# Patient Record
Sex: FEMALE | Race: WHITE
[De-identification: names, ages, dates, MRNs, and addresses within clinical notes are randomized per-mention and may not be internally consistent; named-entity substitution may affect disease eponyms.]

---

## 2017-08-05 NOTE — REPUSA
CLINICAL HISTORY: Trauma.

TECHNIQUE: Multiple axial CT images were obtained through chest with IV contrast material. MPR corona
l and sagittal sequences were obtained.

COMMENTS:

There is no evidence of pleural or parenchymal mass. There are no pleural effusions. There is no evid
ence of hilar or mediastinal lymphadenopathy. The heart and great vessels are within normal limits.

The bony structures are free of lytic or blastic lesions. Multilevel degenerative changes are seen in
volving the thoracic spine. No fracture is noted.

No evidence for abnormal enhancement.

IMPRESSION:

No evidence of acute thoracic pathology. 

Thank you for your kind referral of this patient.

 

     Electronically signed by VLADISLAV BARON MD on 08/05/2017 11:45:48 PM ET

## 2017-08-05 NOTE — REPUSA
CLINICAL HISTORY: Neck pain. Trauma.

TECHNIQUE: Multiple axial images were obtained through the cervical spine. Images were also reconstru
cted in coronal and sagittal planes. The study was performed without IV contrast.

COMMENTS:

There is no fracture or spondylolisthesis visualized. The paraspinal soft tissues are unremarkable. T
here are no lytic or blastic lesions.

Straightening of cervical lordosis is seen, suggesting muscular spasm. There is evidence of multileve
l disk disease, demonstrated by osteophytosis and endplate sclerosis.

IMPRESSION:

1. No fracture or spondylolisthesis.

2. Straightening of cervical lordosis is seen, suggesting muscular spasm.

3. Multilevel spondylosis. 

Thank you for your kind referral of this patient.

     Electronically signed by VLADISLAV BARON MD on 08/05/2017 11:42:10 PM ET

## 2017-08-05 NOTE — REPUSA
CLINICAL HISTORY: Head trauma.

TECHNIQUE: Multiple axial brain CT scan sections were obtained from base to vertex without contrast a
dministration.

COMMENTS: 

There is no evidence of skull fracture.

The study shows normal configuration of sella turcica. There are no intra or extra-axial collections.
 There is no mass effect or midline shift. There is no evidence of hematoma formation. No hydrocephal
us is present. No abnormal calcifications are noted.

No significant abnormalities are seen either in the posterior fossa or supratentorial compartment.

The mastoid air cells are patent.

IMPRESSION:

No evidence of acute intracranial pathology. No intracranial hemorrhage or skull fracture.

Thank you for your kind referral of this patient.

     Electronically signed by VLADISLAV BARON MD on 08/05/2017 11:49:22 PM ET

## 2017-08-06 NOTE — REP
AP SUPINE PORTABLE CHEST: 08/05/2017.

 

Comparison: CT chest earlier this date and chest x-ray 07/10/2014.

 

Clinical history:  Trauma.

 

Findings:  Metallic artifact from spine board and strap overlying the chest.

There is elevated right diaphragm as on CT and  film.   The lung fields are

overpenetrated.  The cardiomediastinal silhouette is not abnormally enlarged nor

is there widening of the mediastinum.  There is a fracture of the proximal

humerus seen in part and the left humerus is obscured by marker, spine board

metal and edge of field of view.  On the  for the CT scan earlier this

evening the upper right humeral shaft fracture is seen and I am suspicious for a

possible fracture on the left.

 

Impression:

 

1.  Very limited supine portable chest with lung fields overpenetrated.  No

midline shift.  Cardial mediastinal silhouette not enlarged.  Elevated

diaphragm.

 

2.  There is a definite fracture of the proximal right humeral shaft near the

surgical neck.  Left proximal humerus not well seen on this exam and a fracture

is not excluded there.  Dedicated bilateral shoulder and humerus views are

needed.

 

 

Signed by

Marcello Villa MD 08/06/2017 05:59 P

## 2017-08-06 NOTE — ECGEPIP
Stationary ECG Study

                           [facility] - ED

                                       

                                       Test Date:    2017

Pat Name:     HERSON CHANCE             Department:   

Patient ID:   [id number]                 Room:         -

Gender:       F                        Technician:   aliya VOSSB:          1965               Requested By: MICHAEL BANDA

Order Number: [id number]     Reading MD:   Kevin Cueto

                                 Measurements

Intervals                              Axis          

Rate:         91                       P:            39

KY:           134                      QRS:          39

QRSD:         88                       T:            45

QT:           364                                    

QTc:          448                                    

                           Interpretive Statements

SINUS RHYTHM

 

Electronically Signed On 2017 8:11:05 EDT by Kevin Cueto

## 2017-08-06 NOTE — REP
LEFT FEMUR SERIES:  08/05/2017.

 

Clinical history:  Trauma.  Evaluate for fracture.

 

Findings:

 

Seven views were provided of the femur.  A spine board yahaira was present overlying

much of the bone.  The hip, acetabulum, pubic rami and symphysis pubis intact.

The femoral neck, trochanters and shaft of the femur intact.  The distal femoral

metaphysis and condyles were unremarkable.  There is no suprapatellar effusion on

the cross-table lateral view.  The view over the distal femur and knee there is a

displaced fracture of the proximal tibial shaft.  Please see the tibia-fibula

series for full detail.

 

Impression:

 

1.  No visible femoral fracture.  A displaced proximal tibial fracture noted.

 

 

Signed by

Marcello Villa MD 08/06/2017 06:02 P

## 2017-08-06 NOTE — REPUSA
HISTORY: Trauma.

TECHNIQUE: Multiple thin section helically-acquired axially-displayed and helically acquired coronall
y displayed computed tomographic images of the face are obtained from the mandible through the fronta
l sinuses, with images obtained at soft tissue and bone window. 2D reformatted images were performed.


FINDINGS:

There are commuted fractures of anterior and medial walls of left maxillary sinus.

There is a minimal fracture involving left orbital floor without herniation of orbital contents or en
trapment.  Extensive left periorbital swelling is seen..

Bilateral ethmoid, left maxillary and left sphenoid sinusitis is seen.

Normal bony mineralization. No additional fractures.

Normal oral and nasal cavities.

Normal infratemporal fossa and deep parapharyngeal spaces with normal muscles of mastication. Normal 
parotid and submandibular glands.

IMPRESSION:

Comminuted fractures of anterior and medial walls of left maxillary sinus.

Minimal fracture involving left orbital floor without herniation of orbital contents. 

Extensive left periorbital swelling is seen..

 

     Electronically signed by VLADISLAV BARON MD on 08/06/2017 12:00:28 AM ET

## 2017-08-06 NOTE — REPUSA
CLINICAL HISTORY: Trauma.

 

TECHNIQUE: CT chest, abdomen and pelvis with contrast total DLP 2116.4 mGy*cm

 

COMPARISON: No study for comparison is available at the time of interpretation.

CT CHEST WITH CONTRAST

Great vessels: The aorta is normal caliber. The pulmonary arteries are patent to the extent visualize
d.

Heart: Normal size, no effusion.

Mediastinum: Nontraumatic.

Lungs: Patchy regions of atelectasis, without contusion, pneumothorax or effusion.

Skeletal thorax: There is a nondisplaced fracture of the left posterior 10th rib (axial image 76-78).
 There is a nondisplaced fracture of the right T12 transverse process. There is a minimally displaced
 fracture of the right L1 transverse process. There are nondisplaced fractures of the right 7th and 8
th ribs anteriorly.

Stomach: Small hiatal hernia noted.

CT ABDOMEN WITH CONTRAST

Liver: Nontraumatic. No ductal dilation.

Gallbladder: Nondistended.

Pancreas: Nontraumatic. No ductal dilation.

Spleen: Normal.

Adrenals: Normal.

Kidneys: Nontraumatic. No hydronephrosis. Small right renal cyst.

Aorta: Nontraumatic. Normal caliber.

Bowel loops: Nondistended.

Peritoneum: No free air. No ascites.

Lumbar spine: Fractures of the transverse processes of right L1, right L2, bilateral L3, bilateral L4
, bilateral L5, and right S1 levels. No lumbar compression fractures.

CT PELVIS WITH CONTRAST

Pelvis: Fractures of the right iliac wing extending into the posterior aspect of the right acetabulum
. Fractures of the right superior and inferior pubic rami, extending into the anterior aspect of the 
right acetabulum. There is edema and fat stranding of the right pelvic wall, extending along the righ
t external iliac vessels, such that a minor injury of the external iliac vein is not excluded.

Bladder: Nontraumatic.

Colon: Scattered diverticula without evidence of diverticulitis.

IMPRESSION:

1. Major fracture of the right pelvis, involving the right iliac wing, anterior and posterior columns
 of the right acetabulum, and superior and inferior right pubic rami. Fat stranding of the right pelv
ic wall, extending along the right external iliac vessels, such that a minor injury of the external i
liac vein is not excluded.

 

2. Small fractures of the right 7th and 8th ribs, left 10th rib, and right T12 transverse process.

3. Fractures of the transverse processes of right L1, right L2, bilateral L3, bilateral L4, bilateral
 L5, and right S1 levels.

     Electronically signed by ANDREA HEADLEY MD on 08/05/2017 11:52:35 PM ET

## 2017-08-06 NOTE — REP
LEFT TIBIA-FIBULA SERIES:  08/05/2017.

 

Clinical history:  Trauma.

 

Findings:  Four views are provided.  There are displaced fractures of the

proximal shaft in the junction of the middle and distal one-third shaft of the

tibia.  The middle fragment is displaced medially and posteriorly by up to

one-half shaft width.  The distal fragment is displaced from that middle fragment

by a similar half shaft width.  There is also a displaced fracture of the distal

one-third shaft of the fibula adjacent to the more inferior tibial fracture.  No

other fractures.

 

 

Signed by

Marcello Villa MD 08/06/2017 06:02 P

## 2017-09-19 NOTE — REPUSA
Clinical history: Right upper quadrant pain.

Findings: The pancreas is limited in visualization secondary to overlying bowel gas, but appears dalila
sly unremarkable. The liver demonstrates increased echotexture and echogenicity, with no mass lesions
. The gallbladder contains echogenic, non-shadowing debris. The gallbladder wall is slightly thickene
d measuring 3.2 mm and diameter. A positive sonographic Hayden's sign was elicited. Suspected tiny ga
llstones are also noted. The common bile duct measures 6 mm and is within normal limits. The right ki
dney measures 12.4 x 5.8 x 4.2 cm. A simple right renal cyst measures 1.7 x 1.4 x 2.1 cm. There is no
 ascites.

Impression:

1. Cholelithiasis, gallbladder sludge, with minimal gallbladder wall thickening. No biliary ductal di
latation or pericholecystic free fluid. However, with the positive sonographic Hayden's sign, early a
cute cholecystitis must be considered.

2. Fatty infiltration of the liver.

3. Simple right renal cyst.

     Electronically signed by ANGEL LEUNG MD on 09/19/2017 06:16:59 PM ET

## 2017-09-19 NOTE — ECGEPIP
Stationary ECG Study

                           [facility] - ED

                                       

                                       Test Date:    2017

Pat Name:     HERSON CHANCE             Department:   

Patient ID:   [id number]                 Room:         -

Gender:       F                        Technician:   thomas

:          1965               Requested By: Kevin FALK

Order Number: [id number]     Reading MD:   Kevin Cueto

                                 Measurements

Intervals                              Axis          

Rate:         104                      P:            22

AR:           153                      QRS:          14

QRSD:         107                      T:            39

QT:           350                                    

QTc:          462                                    

                           Interpretive Statements

SINUS TACHYCARDIA

 

 

Electronically Signed On 2017 20:11:22 EDT by Kevin Cueto

## 2017-09-19 NOTE — REPUSA
CT of the chest

Clinical statement: fever.

Technique: Multiple axial CT images were obtained from the thoracic inlet through the upper abdomen a
fter a bolus administration of nonionic intravenous contrast. Coronal and sagittal reconstructions we
re also obtained.

Comparison: 8/5/2017.

Findings: The pulmonary arteries are well-opacified with contrast, with no intraluminal filling defec
ts to suggest embolism. The thoracic aorta is unremarkable. Thyroid gland is within normal limits. Th
ere is no thoracic lymphadenopathy. There are no pericardial or pleural effusions. There is minimal a
telectasis in the lung bases bilaterally. Limited imaging of the upper abdomen is unremarkable. There
 are no acute osseous abnormalities. The previous fractures are stable and healing.

Impression: Minimal atelectasis/infiltrate in the lung bases bilaterally. Otherwise stable examinatio
n.

     Electronically signed by ANGEL LEUNG MD on 09/19/2017 09:42:12 PM ET

## 2017-09-19 NOTE — REPUSA
CT of the abdomen and pelvis with contrast

Clinical statement: Pain. Fever.

Technique: Multiple axial CT images were obtained from the base of the lungs through the floor of the
 pelvis utilizing 5 mm axial slices after administration of oral and nonionic intravenous contrast. C
oronal and sagittal reconstructions were also obtained.

Comparison: 8/5/2017.

Findings:

Abdomen: The liver, spleen, pancreas, kidneys, gallbladder, and adrenal glands are unremarkable. The 
aorta is within normal limits. There is no evidence of abdominal lymphadenopathy or ascites. There is
 a small umbilical hernia containing only omental fat.

Pelvis: The bowel is unremarkable, with no obstructive or inflammatory changes. The urinary bladder i
s contracted and catheterized. The other pelvic structures appear grossly intact. There is no evidenc
e of pelvic lymphadenopathy or ascites.

Bones: There are surgical screws fixating the right pelvic fracture. The fracture extends into the ri
ght acetabulum. There is a nondisplaced left transverse process fractures of L3 and L4. There is a he
aling fracture the right inferior pubic ramus. No acute fractures identified.

Impression:

1. No focal acute abnormality to explain the patient's pain.

2. No obstructive or inflammatory bowel changes.

3. Small umbilical hernia without bowel involvement.

4. Catheterized, khoi urinary bladder.

5. Subacute/chronic fractures as described. No acute fractures are identified.

     Electronically signed by ANGEL LEUNG MD on 09/19/2017 09:50:26 PM ET

## 2017-09-20 NOTE — REP
PORTABLE CHEST:

 

AP portable view of the chest is performed.  Comparison 07/10/2014 and

08/05/2017.  There is elevation of the right hemidiaphragm again seen.  There is

mild right base fibroatelectatic change which is stable.  The mediastinal

silhouette is unchanged.  There is no new infiltrate.

 

IMPRESSION:

 

No acute infiltrate.

 

 

Signed by

Sincere Gutierrez MD 09/20/2017 09:41 A

## 2017-09-22 NOTE — ED PDOC
Post-Departure Follow-Up


radiology report faxed to [facility].











Madai Diop MD Sep 22, 2017 08:06

## 2018-03-02 ENCOUNTER — HOSPITAL ENCOUNTER (EMERGENCY)
Dept: HOSPITAL 53 - M ED | Age: 53
Discharge: HOME | End: 2018-03-02
Payer: COMMERCIAL

## 2018-03-02 DIAGNOSIS — Z88.8: ICD-10-CM

## 2018-03-02 DIAGNOSIS — R19.7: ICD-10-CM

## 2018-03-02 DIAGNOSIS — Z98.890: ICD-10-CM

## 2018-03-02 DIAGNOSIS — Z87.891: ICD-10-CM

## 2018-03-02 DIAGNOSIS — R11.2: Primary | ICD-10-CM

## 2018-03-02 DIAGNOSIS — R93.5: ICD-10-CM

## 2018-03-02 DIAGNOSIS — Z79.899: ICD-10-CM

## 2018-03-02 LAB
ALBUMIN/GLOBULIN RATIO: 0.93 (ref 1–1.93)
ALBUMIN: 4 GM/DL (ref 3.2–5.2)
ALKALINE PHOSPHATASE: 126 U/L (ref 45–117)
ALT SERPL W P-5'-P-CCNC: 21 U/L (ref 12–78)
AMYLASE SERPL-CCNC: 43 U/L (ref 25–115)
ANION GAP: 9 MEQ/L (ref 8–16)
AST SERPL-CCNC: 8 U/L (ref 7–37)
BASO #: 0.1 10^3/UL (ref 0–0.2)
BASO %: 0.5 % (ref 0–1)
BILIRUB CONJ SERPL-MCNC: 0.1 MG/DL (ref 0–0.2)
BILIRUBIN,TOTAL: 0.3 MG/DL (ref 0.2–1)
BLOOD UREA NITROGEN: 16 MG/DL (ref 7–18)
CALCIUM LEVEL: 9.8 MG/DL (ref 8.5–10.1)
CARBON DIOXIDE LEVEL: 24 MEQ/L (ref 21–32)
CHLORIDE LEVEL: 108 MEQ/L (ref 98–107)
CREATININE FOR GFR: 0.8 MG/DL (ref 0.55–1.3)
D-DIMER QUANT: 1755.2 NG/ML (ref ?–500)
EOS #: 0.1 10^3/UL (ref 0–0.5)
EOSINOPHIL NFR BLD AUTO: 1.4 % (ref 0–3)
GFR SERPL CREATININE-BSD FRML MDRD: > 60 ML/MIN/{1.73_M2} (ref 51–?)
GLUCOSE, FASTING: 104 MG/DL (ref 70–100)
HEMATOCRIT: 40.5 % (ref 36–47)
HEMOGLOBIN: 13.9 G/DL (ref 12–16)
IMMATURE GRANULOCYTE %: 0.5 % (ref 0–3)
LEUKOCYTE ESTERASE UR AUTO RFX: NEGATIVE
LIPASE: 127 U/L (ref 73–393)
LYMPH #: 1.4 10^3/UL (ref 1.5–4.5)
LYMPH %: 14.3 % (ref 24–44)
MEAN CORPUSCULAR HEMOGLOBIN: 29.7 PG (ref 27–33)
MEAN CORPUSCULAR HGB CONC: 34.3 G/DL (ref 32–36.5)
MEAN CORPUSCULAR VOLUME: 86.5 FL (ref 80–96)
MONO #: 0.6 10^3/UL (ref 0–0.8)
MONO %: 5.8 % (ref 0–5)
NEUTROPHILS #: 7.3 10^3/UL (ref 1.8–7.7)
NEUTROPHILS %: 77.5 % (ref 36–66)
NRBC BLD AUTO-RTO: 0 % (ref 0–0)
PLATELET COUNT, AUTOMATED: 406 10^3/UL (ref 150–450)
POTASSIUM SERUM: 4.4 MEQ/L (ref 3.5–5.1)
RED BLOOD COUNT: 4.68 10^6/UL (ref 4–5.4)
RED CELL DISTRIBUTION WIDTH: 12.7 % (ref 11.5–14.5)
SODIUM LEVEL: 141 MEQ/L (ref 136–145)
SPECIFIC GRAVITY UR AUTO RFX: 1.01 (ref 1–1.03)
SQUAM EPITHELIAL CELL UR AURFX: 0 /HPF (ref 0–6)
TOTAL PROTEIN: 8.3 GM/DL (ref 6.4–8.2)
WHITE BLOOD COUNT: 9.4 10^3/UL (ref 4–10)

## 2018-03-02 RX ADMIN — ONDANSETRON 1 MG: 2 INJECTION INTRAMUSCULAR; INTRAVENOUS at 08:15

## 2018-03-02 RX ADMIN — SODIUM CHLORIDE 1 MLS/HR: 9 INJECTION, SOLUTION INTRAVENOUS at 08:54

## 2018-04-16 ENCOUNTER — HOSPITAL ENCOUNTER (OUTPATIENT)
Dept: HOSPITAL 53 - M LAB REF | Age: 53
End: 2018-04-16
Attending: INTERNAL MEDICINE
Payer: COMMERCIAL

## 2018-04-16 DIAGNOSIS — Z51.81: Primary | ICD-10-CM

## 2018-04-16 DIAGNOSIS — Z79.2: ICD-10-CM

## 2018-04-16 LAB
ALBUMIN/GLOBULIN RATIO: 0.95 (ref 1–1.93)
ALBUMIN: 3.7 GM/DL (ref 3.2–5.2)
ALKALINE PHOSPHATASE: 142 U/L (ref 45–117)
ALT SERPL W P-5'-P-CCNC: 18 U/L (ref 12–78)
ANION GAP: 5 MEQ/L (ref 8–16)
AST SERPL-CCNC: 10 U/L (ref 7–37)
BASO #: 0.1 10^3/UL (ref 0–0.2)
BASO %: 0.9 % (ref 0–1)
BILIRUBIN,TOTAL: 0.3 MG/DL (ref 0.2–1)
BLOOD UREA NITROGEN: 16 MG/DL (ref 7–18)
CALCIUM LEVEL: 9 MG/DL (ref 8.5–10.1)
CARBON DIOXIDE LEVEL: 26 MEQ/L (ref 21–32)
CHLORIDE LEVEL: 111 MEQ/L (ref 98–107)
CREATININE FOR GFR: 0.66 MG/DL (ref 0.55–1.3)
CRP SERPL-MCNC: 0.98 MG/DL (ref 0–0.3)
EOS #: 0.4 10^3/UL (ref 0–0.5)
EOSINOPHIL NFR BLD AUTO: 7.7 % (ref 0–3)
ERYTHROCYTE SEDIMENTATION RATE: 47 MM/HR (ref 0–30)
GFR SERPL CREATININE-BSD FRML MDRD: > 60 ML/MIN/{1.73_M2} (ref 51–?)
GLUCOSE, FASTING: 91 MG/DL (ref 70–100)
HEMATOCRIT: 35 % (ref 36–47)
HEMOGLOBIN: 11.5 G/DL (ref 12–15.5)
IMMATURE GRANULOCYTE %: 0.7 % (ref 0–3)
LYMPH #: 1.1 10^3/UL (ref 1.5–4.5)
LYMPH %: 20.4 % (ref 24–44)
MEAN CORPUSCULAR HEMOGLOBIN: 29.7 PG (ref 27–33)
MEAN CORPUSCULAR HGB CONC: 32.9 G/DL (ref 32–36.5)
MEAN CORPUSCULAR VOLUME: 90.4 FL (ref 80–96)
MONO #: 0.7 10^3/UL (ref 0–0.8)
MONO %: 12.1 % (ref 0–5)
NEUTROPHILS #: 3.3 10^3/UL (ref 1.8–7.7)
NEUTROPHILS %: 58.2 % (ref 36–66)
NRBC BLD AUTO-RTO: 0 % (ref 0–0)
PLATELET COUNT, AUTOMATED: 282 10^3/UL (ref 150–450)
POTASSIUM SERUM: 4.5 MEQ/L (ref 3.5–5.1)
RED BLOOD COUNT: 3.87 10^6/UL (ref 4–5.4)
RED CELL DISTRIBUTION WIDTH: 13.9 % (ref 11.5–14.5)
SODIUM LEVEL: 142 MEQ/L (ref 136–145)
TOTAL PROTEIN: 7.6 GM/DL (ref 6.4–8.2)
VANCOMYCIN LEVEL TROUGH: 11.5 UG/ML (ref 10–20)
WHITE BLOOD COUNT: 5.6 10^3/UL (ref 4–10)

## 2018-04-20 ENCOUNTER — HOSPITAL ENCOUNTER (OUTPATIENT)
Dept: HOSPITAL 53 - M LAB REF | Age: 53
End: 2018-04-20
Attending: INTERNAL MEDICINE
Payer: COMMERCIAL

## 2018-04-20 DIAGNOSIS — B95.7: ICD-10-CM
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
## 2018-04-20 DIAGNOSIS — Z79.2: ICD-10-CM

## 2018-04-20 DIAGNOSIS — T84.7XXD: Primary | ICD-10-CM

## 2018-04-20 LAB — VANCOMYCIN LEVEL TROUGH: 11.8 UG/ML (ref 10–20)

## 2018-04-20 PROCEDURE — 80202 ASSAY OF VANCOMYCIN: CPT

## 2018-04-23 ENCOUNTER — HOSPITAL ENCOUNTER (OUTPATIENT)
Dept: HOSPITAL 53 - M LAB REF | Age: 53
End: 2018-04-23
Attending: INTERNAL MEDICINE
Payer: COMMERCIAL

## 2018-04-23 DIAGNOSIS — T84.7XXD: Primary | ICD-10-CM

## 2018-04-23 DIAGNOSIS — Z79.2: ICD-10-CM

## 2018-04-23 DIAGNOSIS — B95.7: ICD-10-CM

## 2018-04-23 LAB
ALBUMIN/GLOBULIN RATIO: 1 (ref 1–1.93)
ALBUMIN: 4.1 GM/DL (ref 3.2–5.2)
ALKALINE PHOSPHATASE: 144 U/L (ref 45–117)
ALT SERPL W P-5'-P-CCNC: 30 U/L (ref 12–78)
ANION GAP: 8 MEQ/L (ref 8–16)
AST SERPL-CCNC: 11 U/L (ref 7–37)
BASO #: 0 10^3/UL (ref 0–0.2)
BASO %: 1.1 % (ref 0–1)
BILIRUBIN,TOTAL: 0.3 MG/DL (ref 0.2–1)
BLOOD UREA NITROGEN: 21 MG/DL (ref 7–18)
CALCIUM LEVEL: 9.1 MG/DL (ref 8.5–10.1)
CARBON DIOXIDE LEVEL: 22 MEQ/L (ref 21–32)
CHLORIDE LEVEL: 108 MEQ/L (ref 98–107)
CREATININE FOR GFR: 0.66 MG/DL (ref 0.55–1.3)
CRP SERPL-MCNC: 1.06 MG/DL (ref 0–0.3)
EOS #: 0.4 10^3/UL (ref 0–0.5)
EOSINOPHIL NFR BLD AUTO: 11.7 % (ref 0–3)
ERYTHROCYTE SEDIMENTATION RATE: 59 MM/HR (ref 0–30)
GFR SERPL CREATININE-BSD FRML MDRD: > 60 ML/MIN/{1.73_M2} (ref 51–?)
GLUCOSE, FASTING: 88 MG/DL (ref 70–100)
HEMATOCRIT: 37 % (ref 36–47)
HEMOGLOBIN: 12.6 G/DL (ref 12–15.5)
IMMATURE GRANULOCYTE %: 1.1 % (ref 0–3)
LYMPH #: 1 10^3/UL (ref 1.5–4.5)
LYMPH %: 27.7 % (ref 24–44)
MEAN CORPUSCULAR HEMOGLOBIN: 29.7 PG (ref 27–33)
MEAN CORPUSCULAR HGB CONC: 34.1 G/DL (ref 32–36.5)
MEAN CORPUSCULAR VOLUME: 87.3 FL (ref 80–96)
MONO #: 0.6 10^3/UL (ref 0–0.8)
MONO %: 16.3 % (ref 0–5)
NEUTROPHILS #: 1.6 10^3/UL (ref 1.8–7.7)
NEUTROPHILS %: 42.1 % (ref 36–66)
NRBC BLD AUTO-RTO: 0 % (ref 0–0)
PLATELET COUNT, AUTOMATED: 254 10^3/UL (ref 150–450)
POTASSIUM SERUM: 4.5 MEQ/L (ref 3.5–5.1)
RED BLOOD COUNT: 4.24 10^6/UL (ref 4–5.4)
RED CELL DISTRIBUTION WIDTH: 13.4 % (ref 11.5–14.5)
SODIUM LEVEL: 138 MEQ/L (ref 136–145)
TOTAL PROTEIN: 8.2 GM/DL (ref 6.4–8.2)
VANCOMYCIN LEVEL TROUGH: 11.6 UG/ML (ref 10–20)
WHITE BLOOD COUNT: 3.7 10^3/UL (ref 4–10)

## 2018-04-23 PROCEDURE — 80053 COMPREHEN METABOLIC PANEL: CPT

## 2018-04-27 ENCOUNTER — HOSPITAL ENCOUNTER (OUTPATIENT)
Dept: HOSPITAL 53 - M LAB REF | Age: 53
End: 2018-04-27
Attending: INTERNAL MEDICINE
Payer: COMMERCIAL

## 2018-04-27 DIAGNOSIS — B95.7: ICD-10-CM

## 2018-04-27 DIAGNOSIS — T84.7XXD: Primary | ICD-10-CM

## 2018-04-27 DIAGNOSIS — Z79.2: ICD-10-CM
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
## 2018-04-27 LAB — VANCOMYCIN LEVEL TROUGH: 15.1 UG/ML (ref 10–20)

## 2018-04-30 ENCOUNTER — HOSPITAL ENCOUNTER (OUTPATIENT)
Dept: HOSPITAL 53 - M LAB REF | Age: 53
End: 2018-04-30
Attending: INTERNAL MEDICINE
Payer: COMMERCIAL

## 2018-04-30 DIAGNOSIS — Z79.2: Primary | ICD-10-CM

## 2018-04-30 LAB
ALBUMIN/GLOBULIN RATIO: 0.97 (ref 1–1.93)
ALBUMIN: 3.8 GM/DL (ref 3.2–5.2)
ALKALINE PHOSPHATASE: 132 U/L (ref 45–117)
ALT SERPL W P-5'-P-CCNC: 40 U/L (ref 12–78)
ANION GAP: 8 MEQ/L (ref 8–16)
AST SERPL-CCNC: 10 U/L (ref 7–37)
BASO #: 0.1 10^3/UL (ref 0–0.2)
BASO %: 1.1 % (ref 0–1)
BILIRUBIN,TOTAL: 0.2 MG/DL (ref 0.2–1)
BLOOD UREA NITROGEN: 16 MG/DL (ref 7–18)
CALCIUM LEVEL: 8.9 MG/DL (ref 8.5–10.1)
CARBON DIOXIDE LEVEL: 24 MEQ/L (ref 21–32)
CHLORIDE LEVEL: 110 MEQ/L (ref 98–107)
CREATININE FOR GFR: 0.64 MG/DL (ref 0.55–1.3)
CRP SERPL-MCNC: 1.32 MG/DL (ref 0–0.3)
EOS #: 0.5 10^3/UL (ref 0–0.5)
EOSINOPHIL NFR BLD AUTO: 10.5 % (ref 0–3)
ERYTHROCYTE SEDIMENTATION RATE: 50 MM/HR (ref 0–30)
GFR SERPL CREATININE-BSD FRML MDRD: > 60 ML/MIN/{1.73_M2} (ref 51–?)
GLUCOSE, FASTING: 115 MG/DL (ref 70–100)
HEMATOCRIT: 35.6 % (ref 36–47)
HEMOGLOBIN: 11.7 G/DL (ref 12–15.5)
IMMATURE GRANULOCYTE %: 2 % (ref 0–3)
LYMPH #: 1.1 10^3/UL (ref 1.5–4.5)
LYMPH %: 23.5 % (ref 24–44)
MEAN CORPUSCULAR HEMOGLOBIN: 29.6 PG (ref 27–33)
MEAN CORPUSCULAR HGB CONC: 32.9 G/DL (ref 32–36.5)
MEAN CORPUSCULAR VOLUME: 90.1 FL (ref 80–96)
MONO #: 0.5 10^3/UL (ref 0–0.8)
MONO %: 11.2 % (ref 0–5)
NEUTROPHILS #: 2.4 10^3/UL (ref 1.8–7.7)
NEUTROPHILS %: 51.7 % (ref 36–66)
NRBC BLD AUTO-RTO: 0 % (ref 0–0)
PLATELET COUNT, AUTOMATED: 261 10^3/UL (ref 150–450)
POTASSIUM SERUM: 4 MEQ/L (ref 3.5–5.1)
RED BLOOD COUNT: 3.95 10^6/UL (ref 4–5.4)
RED CELL DISTRIBUTION WIDTH: 13.3 % (ref 11.5–14.5)
SODIUM LEVEL: 142 MEQ/L (ref 136–145)
TOTAL PROTEIN: 7.7 GM/DL (ref 6.4–8.2)
VANCOMYCIN LEVEL TROUGH: 14.4 UG/ML (ref 10–20)
WHITE BLOOD COUNT: 4.6 10^3/UL (ref 4–10)

## 2018-07-26 ENCOUNTER — HOSPITAL ENCOUNTER (EMERGENCY)
Dept: HOSPITAL 53 - M ED | Age: 53
Discharge: HOME | End: 2018-07-26
Payer: COMMERCIAL

## 2018-07-26 DIAGNOSIS — Z79.899: ICD-10-CM
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
## 2018-07-26 DIAGNOSIS — I10: ICD-10-CM
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
## 2018-07-26 DIAGNOSIS — J30.2: ICD-10-CM

## 2018-07-26 DIAGNOSIS — Z88.8: ICD-10-CM
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
## 2018-07-26 DIAGNOSIS — S20.212A: Primary | ICD-10-CM

## 2018-07-26 DIAGNOSIS — K21.9: ICD-10-CM

## 2018-07-26 RX ADMIN — PROMETHAZINE HYDROCHLORIDE 1 MG: 25 INJECTION INTRAMUSCULAR; INTRAVENOUS at 12:50

## 2018-07-26 RX ADMIN — MORPHINE SULFATE 1 MG: 10 INJECTION INTRAVENOUS at 12:50

## 2019-03-04 ENCOUNTER — HOSPITAL ENCOUNTER (EMERGENCY)
Dept: HOSPITAL 53 - M ED | Age: 54
Discharge: HOME | End: 2019-03-04
Payer: COMMERCIAL

## 2019-03-04 VITALS — BODY MASS INDEX: 48.82 KG/M2 | WEIGHT: 293 LBS | HEIGHT: 65 IN

## 2019-03-04 VITALS — SYSTOLIC BLOOD PRESSURE: 143 MMHG | DIASTOLIC BLOOD PRESSURE: 76 MMHG

## 2019-03-04 DIAGNOSIS — B37.2: ICD-10-CM

## 2019-03-04 DIAGNOSIS — K21.9: ICD-10-CM

## 2019-03-04 DIAGNOSIS — K52.9: Primary | ICD-10-CM

## 2019-03-04 DIAGNOSIS — M19.90: ICD-10-CM

## 2019-03-04 DIAGNOSIS — Z79.82: ICD-10-CM

## 2019-03-04 DIAGNOSIS — I10: ICD-10-CM

## 2019-03-04 DIAGNOSIS — F32.9: ICD-10-CM

## 2019-03-04 DIAGNOSIS — Z88.1: ICD-10-CM

## 2019-03-04 DIAGNOSIS — Z79.899: ICD-10-CM

## 2019-03-04 LAB
ALBUMIN SERPL BCG-MCNC: 4 GM/DL (ref 3.2–5.2)
ALT SERPL W P-5'-P-CCNC: 34 U/L (ref 12–78)
B-HCG SERPL QL: NEGATIVE
BASOPHILS # BLD AUTO: 0.1 10^3/UL (ref 0–0.2)
BASOPHILS NFR BLD AUTO: 0.6 % (ref 0–1)
BILIRUB CONJ SERPL-MCNC: 0.1 MG/DL (ref 0–0.2)
BILIRUB SERPL-MCNC: 0.4 MG/DL (ref 0.2–1)
BUN SERPL-MCNC: 16 MG/DL (ref 7–18)
CALCIUM SERPL-MCNC: 8.9 MG/DL (ref 8.5–10.1)
CHLORIDE SERPL-SCNC: 107 MEQ/L (ref 98–107)
CO2 SERPL-SCNC: 20 MEQ/L (ref 21–32)
CREAT SERPL-MCNC: 0.99 MG/DL (ref 0.55–1.3)
EOSINOPHIL # BLD AUTO: 0.4 10^3/UL (ref 0–0.5)
EOSINOPHIL NFR BLD AUTO: 2.9 % (ref 0–3)
GFR SERPL CREATININE-BSD FRML MDRD: > 60 ML/MIN/{1.73_M2} (ref 51–?)
GLUCOSE SERPL-MCNC: 148 MG/DL (ref 70–100)
HCT VFR BLD AUTO: 45.5 % (ref 36–47)
HGB BLD-MCNC: 15.3 G/DL (ref 12–15.5)
LIPASE SERPL-CCNC: 136 U/L (ref 73–393)
LYMPHOCYTES # BLD AUTO: 1.4 10^3/UL (ref 1.5–4.5)
LYMPHOCYTES NFR BLD AUTO: 9.8 % (ref 24–44)
MCH RBC QN AUTO: 29.5 PG (ref 27–33)
MCHC RBC AUTO-ENTMCNC: 33.6 G/DL (ref 32–36.5)
MCV RBC AUTO: 87.8 FL (ref 80–96)
MONOCYTES # BLD AUTO: 1.1 10^3/UL (ref 0–0.8)
MONOCYTES NFR BLD AUTO: 7.7 % (ref 0–5)
NEUTROPHILS # BLD AUTO: 11 10^3/UL (ref 1.8–7.7)
NEUTROPHILS NFR BLD AUTO: 78.1 % (ref 36–66)
PLATELET # BLD AUTO: 381 10^3/UL (ref 150–450)
POTASSIUM SERPL-SCNC: 3.8 MEQ/L (ref 3.5–5.1)
PROT SERPL-MCNC: 8.7 GM/DL (ref 6.4–8.2)
RBC # BLD AUTO: 5.18 10^6/UL (ref 4–5.4)
SODIUM SERPL-SCNC: 140 MEQ/L (ref 136–145)
WBC # BLD AUTO: 14 10^3/UL (ref 4–10)

## 2019-03-04 PROCEDURE — 87507 IADNA-DNA/RNA PROBE TQ 12-25: CPT

## 2019-03-04 PROCEDURE — 84703 CHORIONIC GONADOTROPIN ASSAY: CPT

## 2019-03-04 PROCEDURE — 99284 EMERGENCY DEPT VISIT MOD MDM: CPT

## 2019-03-04 PROCEDURE — 36415 COLL VENOUS BLD VENIPUNCTURE: CPT

## 2019-03-04 PROCEDURE — 80048 BASIC METABOLIC PNL TOTAL CA: CPT

## 2019-03-04 PROCEDURE — 96376 TX/PRO/DX INJ SAME DRUG ADON: CPT

## 2019-03-04 PROCEDURE — 96374 THER/PROPH/DIAG INJ IV PUSH: CPT

## 2019-03-04 PROCEDURE — 85025 COMPLETE CBC W/AUTO DIFF WBC: CPT

## 2019-03-04 PROCEDURE — 96375 TX/PRO/DX INJ NEW DRUG ADDON: CPT

## 2019-03-04 PROCEDURE — 83690 ASSAY OF LIPASE: CPT

## 2019-03-04 PROCEDURE — 80076 HEPATIC FUNCTION PANEL: CPT

## 2019-04-24 ENCOUNTER — HOSPITAL ENCOUNTER (OUTPATIENT)
Dept: HOSPITAL 53 - M EKG | Age: 54
End: 2019-04-24
Attending: ORTHOPAEDIC SURGERY
Payer: COMMERCIAL

## 2019-04-24 DIAGNOSIS — Z01.818: Primary | ICD-10-CM

## 2019-04-25 NOTE — ECGEPIP
Stationary ECG Study

                              [facility]

                                       

                                       Test Date:    2019

Pat Name:     HERSON CHANCE             Department:   

Patient ID:   [id number]                 Room:         -

Gender:       F                        Technician:   

:          1965               Requested By: Enoc WARREN

Order Number: [id number]     Reading MD:   Drew Abdalla

                                 Measurements

Intervals                              Axis          

Rate:         57                       P:            37

PA:           167                      QRS:          14

QRSD:         93                       T:            44

QT:           406                                    

QTc:          398                                    

                           Interpretive Statements

SINUS BRADYCARDIA

Delayed anterior R wave progression

Electronically Signed On 2019 0:37:51 EDT by Drew Abdalla

## 2019-07-30 ENCOUNTER — HOSPITAL ENCOUNTER (OUTPATIENT)
Dept: HOSPITAL 53 - M RAD | Age: 54
End: 2019-07-30
Attending: PHYSICIAN ASSISTANT
Payer: COMMERCIAL
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
## 2019-07-30 DIAGNOSIS — S80.02XA: Primary | ICD-10-CM

## 2019-07-30 NOTE — REP
Clinical:  Contusion.

 

Technique:  AP, lateral, bilateral oblique views of the left knee.

 

Findings:

Evidence for prior tibial fracture with intramedullary yahaira and screws identified

as well as periosteal reaction and callus formation at the site of fracture.

Underlying arthritic changes are appreciated.  Lateral view demonstrates moderate

to significant soft tissue swelling and evidence for effusion.  No obvious acute

fracture identified.

 

Impression:

Acute swelling and effusion noted.  Underlying osteopenia and post

traumatic/surgical arthritic changes somewhat limit evaluation.  No obvious acute

fracture identified.

 

 

Electronically Signed by

Corey Blanca MD 07/30/2019 10:36 A

## 2019-09-25 ENCOUNTER — HOSPITAL ENCOUNTER (OUTPATIENT)
Dept: HOSPITAL 53 - M LAB REF | Age: 54
End: 2019-09-25
Attending: ADVANCED PRACTICE MIDWIFE
Payer: COMMERCIAL

## 2019-09-25 DIAGNOSIS — Z12.4: Primary | ICD-10-CM

## 2019-09-26 LAB — HPV LOW VOL RFLX: (no result)

## 2019-10-01 ENCOUNTER — HOSPITAL ENCOUNTER (OUTPATIENT)
Dept: HOSPITAL 53 - M LAB REF | Age: 54
End: 2019-10-01
Attending: NURSE PRACTITIONER
Payer: COMMERCIAL

## 2019-10-01 DIAGNOSIS — Z00.01: Primary | ICD-10-CM

## 2019-10-01 LAB
25(OH)D3 SERPL-MCNC: 47.7 NG/ML (ref 30–100)
ALBUMIN SERPL BCG-MCNC: 3.8 GM/DL (ref 3.2–5.2)
ALT SERPL W P-5'-P-CCNC: 41 U/L (ref 12–78)
BASOPHILS # BLD AUTO: 0.1 10^3/UL (ref 0–0.2)
BASOPHILS NFR BLD AUTO: 0.8 % (ref 0–1)
BILIRUB SERPL-MCNC: 0.3 MG/DL (ref 0.2–1)
BUN SERPL-MCNC: 13 MG/DL (ref 7–18)
CALCIUM SERPL-MCNC: 9.3 MG/DL (ref 8.5–10.1)
CHLORIDE SERPL-SCNC: 107 MEQ/L (ref 98–107)
CHOLEST SERPL-MCNC: 220 MG/DL (ref ?–200)
CHOLEST/HDLC SERPL: 5.5 {RATIO} (ref ?–5)
CO2 SERPL-SCNC: 25 MEQ/L (ref 21–32)
CREAT SERPL-MCNC: 0.86 MG/DL (ref 0.55–1.3)
EOSINOPHIL # BLD AUTO: 0.3 10^3/UL (ref 0–0.5)
EOSINOPHIL NFR BLD AUTO: 3.3 % (ref 0–3)
EST. AVERAGE GLUCOSE BLD GHB EST-MCNC: 117 MG/DL (ref 60–110)
GFR SERPL CREATININE-BSD FRML MDRD: > 60 ML/MIN/{1.73_M2} (ref 51–?)
GLUCOSE SERPL-MCNC: 103 MG/DL (ref 70–100)
HCT VFR BLD AUTO: 41 % (ref 36–47)
HDLC SERPL-MCNC: 40 MG/DL (ref 40–?)
HGB BLD-MCNC: 13.3 G/DL (ref 12–15.5)
LDLC SERPL CALC-MCNC: 132 MG/DL (ref ?–100)
LYMPHOCYTES # BLD AUTO: 1.3 10^3/UL (ref 1.5–5)
LYMPHOCYTES NFR BLD AUTO: 15.9 % (ref 24–44)
MCH RBC QN AUTO: 28.4 PG (ref 27–33)
MCHC RBC AUTO-ENTMCNC: 32.4 G/DL (ref 32–36.5)
MCV RBC AUTO: 87.4 FL (ref 80–96)
MONOCYTES # BLD AUTO: 0.6 10^3/UL (ref 0–0.8)
MONOCYTES NFR BLD AUTO: 7.5 % (ref 0–5)
NEUTROPHILS # BLD AUTO: 5.8 10^3/UL (ref 1.5–8.5)
NEUTROPHILS NFR BLD AUTO: 71.9 % (ref 36–66)
NONHDLC SERPL-MCNC: 180 MG/DL
PLATELET # BLD AUTO: 316 10^3/UL (ref 150–450)
POTASSIUM SERPL-SCNC: 4.2 MEQ/L (ref 3.5–5.1)
PROT SERPL-MCNC: 7.8 GM/DL (ref 6.4–8.2)
RBC # BLD AUTO: 4.69 10^6/UL (ref 4–5.4)
SODIUM SERPL-SCNC: 141 MEQ/L (ref 136–145)
T4 FREE SERPL-MCNC: 1.13 NG/DL (ref 0.76–1.46)
TRIGL SERPL-MCNC: 238 MG/DL (ref ?–150)
TSH SERPL DL<=0.005 MIU/L-ACNC: 0.74 UIU/ML (ref 0.36–3.74)
WBC # BLD AUTO: 8 10^3/UL (ref 4–10)

## 2019-10-03 ENCOUNTER — HOSPITAL ENCOUNTER (OUTPATIENT)
Dept: HOSPITAL 53 - M RAD | Age: 54
End: 2019-10-03
Attending: NURSE PRACTITIONER
Payer: COMMERCIAL

## 2019-10-03 DIAGNOSIS — Z12.31: Primary | ICD-10-CM

## 2019-10-08 ENCOUNTER — HOSPITAL ENCOUNTER (OUTPATIENT)
Dept: HOSPITAL 53 - M RAD | Age: 54
End: 2019-10-08
Attending: FAMILY MEDICINE
Payer: COMMERCIAL

## 2019-10-08 DIAGNOSIS — Z96.611: ICD-10-CM
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
## 2019-10-08 DIAGNOSIS — S42.342A: ICD-10-CM

## 2019-10-08 DIAGNOSIS — M25.512: Primary | ICD-10-CM

## 2019-10-08 NOTE — REP
Left shoulder four views:

There are no comparison shoulder studies.

On a portable study of the chest dated 09/19/2017, the  patient had bilateral

shoulder arthroplasties.

On the current left shoulder study, the he arthroplasty has been removed.

There is a spiral fracture in the proximal shaft of the humerus.  This could be

acute or chronic or acute on chronic .  Correlate with clinical point

tenderness.

 

There is deformity of the humeral head, likely postsurgical change.

 

On the lateral view I suspect that the proximal portion of the humerus is

subluxed anteriorly in relation to the glenoid.

 

 

Electronically Signed by

Sincere Martinez MD 10/08/2019 01:05 P

## 2020-02-11 ENCOUNTER — HOSPITAL ENCOUNTER (OUTPATIENT)
Dept: HOSPITAL 53 - M LAB REF | Age: 55
End: 2020-02-11
Attending: NURSE PRACTITIONER
Payer: COMMERCIAL

## 2020-02-11 DIAGNOSIS — E55.9: ICD-10-CM

## 2020-02-11 DIAGNOSIS — Z13.9: ICD-10-CM

## 2020-02-11 DIAGNOSIS — I10: ICD-10-CM

## 2020-02-11 DIAGNOSIS — Z12.4: ICD-10-CM

## 2020-02-11 DIAGNOSIS — R73.9: Primary | ICD-10-CM

## 2020-02-11 DIAGNOSIS — E78.5: ICD-10-CM

## 2020-02-11 LAB
25(OH)D3 SERPL-MCNC: 34.3 NG/ML (ref 30–100)
ALBUMIN SERPL BCG-MCNC: 3.8 GM/DL (ref 3.2–5.2)
ALT SERPL W P-5'-P-CCNC: 29 U/L (ref 12–78)
BASOPHILS # BLD AUTO: 0.1 10^3/UL (ref 0–0.2)
BASOPHILS NFR BLD AUTO: 0.9 % (ref 0–1)
BILIRUB SERPL-MCNC: 0.2 MG/DL (ref 0.2–1)
BUN SERPL-MCNC: 16 MG/DL (ref 7–18)
CALCIUM SERPL-MCNC: 9.1 MG/DL (ref 8.5–10.1)
CHLORIDE SERPL-SCNC: 109 MEQ/L (ref 98–107)
CHOLEST SERPL-MCNC: 212 MG/DL (ref ?–200)
CHOLEST/HDLC SERPL: 5.43 {RATIO} (ref ?–5)
CO2 SERPL-SCNC: 29 MEQ/L (ref 21–32)
CREAT SERPL-MCNC: 0.76 MG/DL (ref 0.55–1.3)
EOSINOPHIL # BLD AUTO: 0.3 10^3/UL (ref 0–0.5)
EOSINOPHIL NFR BLD AUTO: 4.5 % (ref 0–3)
EST. AVERAGE GLUCOSE BLD GHB EST-MCNC: 114 MG/DL (ref 60–110)
GFR SERPL CREATININE-BSD FRML MDRD: > 60 ML/MIN/{1.73_M2} (ref 51–?)
GLUCOSE SERPL-MCNC: 100 MG/DL (ref 70–100)
HCT VFR BLD AUTO: 41.8 % (ref 36–47)
HDLC SERPL-MCNC: 39 MG/DL (ref 40–?)
HGB BLD-MCNC: 13.5 G/DL (ref 12–15.5)
LDLC SERPL CALC-MCNC: 137 MG/DL (ref ?–100)
LYMPHOCYTES # BLD AUTO: 1.3 10^3/UL (ref 1.5–5)
LYMPHOCYTES NFR BLD AUTO: 17.4 % (ref 24–44)
MCH RBC QN AUTO: 28.8 PG (ref 27–33)
MCHC RBC AUTO-ENTMCNC: 32.3 G/DL (ref 32–36.5)
MCV RBC AUTO: 89.1 FL (ref 80–96)
MONOCYTES # BLD AUTO: 0.6 10^3/UL (ref 0–0.8)
MONOCYTES NFR BLD AUTO: 7.5 % (ref 0–5)
NEUTROPHILS # BLD AUTO: 5.3 10^3/UL (ref 1.5–8.5)
NEUTROPHILS NFR BLD AUTO: 69.2 % (ref 36–66)
NONHDLC SERPL-MCNC: 173 MG/DL
PLATELET # BLD AUTO: 331 10^3/UL (ref 150–450)
POTASSIUM SERPL-SCNC: 4.3 MEQ/L (ref 3.5–5.1)
PROT SERPL-MCNC: 7.5 GM/DL (ref 6.4–8.2)
RBC # BLD AUTO: 4.69 10^6/UL (ref 4–5.4)
SODIUM SERPL-SCNC: 142 MEQ/L (ref 136–145)
T4 FREE SERPL-MCNC: 1.19 NG/DL (ref 0.76–1.46)
TRIGL SERPL-MCNC: 178 MG/DL (ref ?–150)
TSH SERPL DL<=0.005 MIU/L-ACNC: 0.65 UIU/ML (ref 0.36–3.74)
WBC # BLD AUTO: 7.6 10^3/UL (ref 4–10)

## 2020-04-03 ENCOUNTER — HOSPITAL ENCOUNTER (OUTPATIENT)
Dept: HOSPITAL 53 - M PAIN | Age: 55
End: 2020-04-03
Attending: NURSE PRACTITIONER
Payer: COMMERCIAL

## 2020-04-03 DIAGNOSIS — Z79.899: ICD-10-CM

## 2020-04-03 DIAGNOSIS — Z79.82: ICD-10-CM

## 2020-04-03 DIAGNOSIS — Z88.8: ICD-10-CM

## 2020-04-03 DIAGNOSIS — Z79.891: ICD-10-CM

## 2020-04-03 DIAGNOSIS — M25.511: ICD-10-CM

## 2020-04-03 DIAGNOSIS — I10: ICD-10-CM

## 2020-04-03 DIAGNOSIS — M25.512: Primary | ICD-10-CM
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
## 2020-05-05 ENCOUNTER — HOSPITAL ENCOUNTER (OUTPATIENT)
Dept: HOSPITAL 53 - M PAIN | Age: 55
End: 2020-05-05
Attending: NURSE PRACTITIONER
Payer: COMMERCIAL

## 2020-05-05 DIAGNOSIS — M25.512: Primary | ICD-10-CM

## 2020-05-05 DIAGNOSIS — Z88.8: ICD-10-CM

## 2020-05-05 DIAGNOSIS — G89.29: ICD-10-CM

## 2020-05-05 DIAGNOSIS — I10: ICD-10-CM

## 2020-05-05 DIAGNOSIS — Z79.82: ICD-10-CM

## 2020-05-05 DIAGNOSIS — Z79.899: ICD-10-CM

## 2020-05-05 DIAGNOSIS — M25.511: ICD-10-CM

## 2020-05-05 DIAGNOSIS — Z79.891: ICD-10-CM
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
## 2020-06-15 ENCOUNTER — HOSPITAL ENCOUNTER (EMERGENCY)
Dept: HOSPITAL 53 - M ED | Age: 55
Discharge: HOME | End: 2020-06-15
Payer: COMMERCIAL

## 2020-06-15 VITALS — BODY MASS INDEX: 47.09 KG/M2 | HEIGHT: 66 IN | WEIGHT: 293 LBS

## 2020-06-15 VITALS — DIASTOLIC BLOOD PRESSURE: 70 MMHG | SYSTOLIC BLOOD PRESSURE: 154 MMHG

## 2020-06-15 DIAGNOSIS — R73.03: ICD-10-CM

## 2020-06-15 DIAGNOSIS — I10: ICD-10-CM

## 2020-06-15 DIAGNOSIS — Z88.1: ICD-10-CM

## 2020-06-15 DIAGNOSIS — R07.9: Primary | ICD-10-CM

## 2020-06-15 DIAGNOSIS — Z79.899: ICD-10-CM

## 2020-06-15 DIAGNOSIS — K21.9: ICD-10-CM

## 2020-06-15 DIAGNOSIS — Z79.82: ICD-10-CM

## 2020-06-15 LAB
ALBUMIN SERPL BCG-MCNC: 3.8 GM/DL (ref 3.2–5.2)
ALT SERPL W P-5'-P-CCNC: 37 U/L (ref 12–78)
BASOPHILS # BLD AUTO: 0.1 10^3/UL (ref 0–0.2)
BASOPHILS NFR BLD AUTO: 0.6 % (ref 0–1)
BILIRUB CONJ SERPL-MCNC: < 0.1 MG/DL (ref 0–0.2)
BILIRUB SERPL-MCNC: 0.3 MG/DL (ref 0.2–1)
BUN SERPL-MCNC: 13 MG/DL (ref 7–18)
CALCIUM SERPL-MCNC: 9.2 MG/DL (ref 8.5–10.1)
CHLORIDE SERPL-SCNC: 107 MEQ/L (ref 98–107)
CK MB CFR.DF SERPL CALC: 1.04
CK MB CFR.DF SERPL CALC: 1.31
CK MB SERPL-MCNC: 1.1 NG/ML (ref ?–3.6)
CK MB SERPL-MCNC: 1.3 NG/ML (ref ?–3.6)
CK SERPL-CCNC: 125 U/L (ref 26–192)
CK SERPL-CCNC: 84 U/L (ref 26–192)
CO2 SERPL-SCNC: 25 MEQ/L (ref 21–32)
CREAT SERPL-MCNC: 0.73 MG/DL (ref 0.55–1.3)
EOSINOPHIL # BLD AUTO: 0.2 10^3/UL (ref 0–0.5)
EOSINOPHIL NFR BLD AUTO: 2.8 % (ref 0–3)
GFR SERPL CREATININE-BSD FRML MDRD: > 60 ML/MIN/{1.73_M2} (ref 51–?)
GLUCOSE SERPL-MCNC: 123 MG/DL (ref 70–100)
HCT VFR BLD AUTO: 41.7 % (ref 36–47)
HGB BLD-MCNC: 13.8 G/DL (ref 12–15.5)
INR PPP: 0.99
LIPASE SERPL-CCNC: 100 U/L (ref 73–393)
LYMPHOCYTES # BLD AUTO: 1.2 10^3/UL (ref 1.5–5)
LYMPHOCYTES NFR BLD AUTO: 14 % (ref 24–44)
MCH RBC QN AUTO: 28.9 PG (ref 27–33)
MCHC RBC AUTO-ENTMCNC: 33.1 G/DL (ref 32–36.5)
MCV RBC AUTO: 87.4 FL (ref 80–96)
MONOCYTES # BLD AUTO: 0.6 10^3/UL (ref 0–0.8)
MONOCYTES NFR BLD AUTO: 6.9 % (ref 0–5)
NEUTROPHILS # BLD AUTO: 6.5 10^3/UL (ref 1.5–8.5)
NEUTROPHILS NFR BLD AUTO: 74.9 % (ref 36–66)
PLATELET # BLD AUTO: 313 10^3/UL (ref 150–450)
POTASSIUM SERPL-SCNC: 4.4 MEQ/L (ref 3.5–5.1)
PROT SERPL-MCNC: 8.2 GM/DL (ref 6.4–8.2)
PROTHROMBIN TIME: 12.8 SECONDS (ref 11.8–14)
RBC # BLD AUTO: 4.77 10^6/UL (ref 4–5.4)
SODIUM SERPL-SCNC: 141 MEQ/L (ref 136–145)
TROPONIN I SERPL-MCNC: < 0.02 NG/ML (ref ?–0.1)
TROPONIN I SERPL-MCNC: < 0.02 NG/ML (ref ?–0.1)
WBC # BLD AUTO: 8.7 10^3/UL (ref 4–10)

## 2020-06-15 PROCEDURE — 93041 RHYTHM ECG TRACING: CPT

## 2020-06-15 PROCEDURE — 85025 COMPLETE CBC W/AUTO DIFF WBC: CPT

## 2020-06-15 PROCEDURE — 71275 CT ANGIOGRAPHY CHEST: CPT

## 2020-06-15 PROCEDURE — 93005 ELECTROCARDIOGRAM TRACING: CPT

## 2020-06-15 PROCEDURE — 99285 EMERGENCY DEPT VISIT HI MDM: CPT

## 2020-06-15 PROCEDURE — 82553 CREATINE MB FRACTION: CPT

## 2020-06-15 PROCEDURE — 80076 HEPATIC FUNCTION PANEL: CPT

## 2020-06-15 PROCEDURE — 80048 BASIC METABOLIC PNL TOTAL CA: CPT

## 2020-06-15 PROCEDURE — 82550 ASSAY OF CK (CPK): CPT

## 2020-06-15 PROCEDURE — 85610 PROTHROMBIN TIME: CPT

## 2020-06-15 PROCEDURE — 74177 CT ABD & PELVIS W/CONTRAST: CPT

## 2020-06-15 PROCEDURE — 93970 EXTREMITY STUDY: CPT

## 2020-06-15 PROCEDURE — 71045 X-RAY EXAM CHEST 1 VIEW: CPT

## 2020-06-15 PROCEDURE — 83690 ASSAY OF LIPASE: CPT

## 2020-06-15 PROCEDURE — 94760 N-INVAS EAR/PLS OXIMETRY 1: CPT

## 2020-06-15 NOTE — REP
PORTABLE CHEST X-RAY:  Single view.

 

HISTORY:  Chest pain.

 

COMPARISON CHEST X-RAY:  July 26, 2018.

 

FINDINGS:  Monitoring electrodes overlie the chest.  The lungs are symmetrically

aerated.  Pleural angles are sharp.  Heart size is borderline.   Pulmonary

vasculature is not increased.  No infiltrate is seen.  There is old post surgical

deformity of the shoulders bilaterally.

 

IMPRESSION:

 

Borderline heart size.  Otherwise no acute disease.

 

 

Electronically Signed by

Nikolay Hahn MD 06/15/2020 02:51 P

## 2020-06-15 NOTE — REP
REASON:  Bilateral pain and swelling.

 

DEEP VENOUS ULTRASONOGRAPHY BILATERAL THIGHS, RULE OUT DVT:

 

TECHNIQUE:  Multiple ultrasonographic images of the deep venous structures of the

thigh were obtained from the common femoral vein to the popliteal vein along with

Doppler interrogation and color flow Doppler images.

 

FINDINGS:  There is no abnormal echogenic material seen within any of the

visualized deep venous structures that would suggest acute thrombosis.

Coaptation is unremarkable throughout.  Doppler interrogation shows an expected

response to respiratory variability and augmentation.  The color flow images show

what appears to be a normal vascular pattern throughout.

 

IMPRESSION:

 

There is no ultrasonographic evidence of deep venous thrombosis involving any of

the visualized deep venous structures of the bilateral thighs, as described

above.

 

 

Electronically Signed by

Adalberto Cuello DO 06/15/2020 04:01 P

## 2020-06-15 NOTE — ECGEPIP
[facility] - ED

                                       

                                       Test Date:    2020-06-15

Pat Name:     HERSON CHANCE             Department:   

Patient ID:   [id number]                 Room:         -

Gender:       Female                   Technician:   shala

:          1965               Requested By: JUDD WILLIS

Order Number: [id number]     Reading MD:   Kevin Cueto

                                 Measurements

Intervals                              Axis          

Rate:         57                       P:            58

WY:           172                      QRS:          10

QRSD:         94                       T:            28

QT:           451                                    

QTc:          440                                    

                           Interpretive Statements

SINUS BRADYCARDIA

POOR R WAVE PROGRESSION

NSTTW ABNORMALITIES

SIMILAR TO PRIOR ON SAME DATE

Electronically Signed on 6- 21:41:37 EDT by Kevin Cueto

## 2020-06-15 NOTE — REP
REASON FOR EXAM:  Chest pain.

 

COMPARISON:  03/02/2018, the latest prior.

 

CONTRAST:  100 mL Optiray 350.

 

There is less than optimal visualization of the pulmonary arterial vasculature.

Small pulmonary emboli cannot be ruled out.  Areas of poor to absent contrast

opacification is seen in small bibasilar pulmonary arterials.  There is no

mediastinal or hilar adenopathy.  There are no pleural or pericardial effusions.

The thoracic aorta is within normal limits although seen in limited fashion due

to motion artifact.  The imaged upper abdomen is within normal limits, although

it too, is seen in a limited fashion due to motion artifact.  The imaged osseous

structures are within normal limits for the patient's age.

 

Evaluation of the lung fields shows significant respiratory motion artifact

obscuring the detail to such a degree small but significant pulmonary nodules

cannot be ruled out.  No masses or spiculated nodules are identified.

 

IMPRESSION:

 

1.  Exam limitations as described above.  Small pulmonary emboli cannot be ruled

out.

 

2.  No gross abnormalities see involving the lung fields with limitations as

described above.

 

3.  Consider repeat or consider ventilation perfusion lung scan if clinically

applicable.

 

 

Electronically Signed by

Adalberto Cuello DO 06/15/2020 04:00 P

## 2020-06-15 NOTE — REP
REASON FOR EXAM:  Upper abdominal pain.

 

COMPARISON: 09/19/2017.

 

CONTRAST:  100 mL Isovue 350.

 

Respiratory motion artifact obscures the detail throughout the exam particularly

the imaged upper abdomen.

 

No gross abnormalities are seen involving the liver, gallbladder, spleen, adrenal

glands or left kidney which appear essentially unchanged from the prior exam.

There is a right renal cyst status quo. The abdominal aorta and para-aortic

regions are within normal limits.  There is no evidence of free fluid or free air

in the abdomen or pelvis.  The intra-abdominal and intrapelvic loops are

essentially unchanged from the prior exam, although seen in a limited fashion due

to artifact.  Once again, there is descending colon and sigmoid colon

diverticulosis.  There is no evidence of an intra-abdominal or intrapelvic mass

or adenopathy.

 

Bone window technique throughout the examination shows no significant change from

the prior examination.

 

IMPRESSION:

 

1.  Exam is limited as described above.

 

2.  No evidence of acute intra-abdominal or intrapelvic disease with findings and

limitations as described above.

 

 

Electronically Signed by

Adalberto Cuello DO 06/15/2020 04:00 P

## 2020-06-15 NOTE — ECGEPIP
[facility] - ED

                                       

                                       Test Date:    2020-06-15

Pat Name:     HERSON CHANCE             Department:   

Patient ID:   [id number]                 Room:         -

Gender:       Female                   Technician:   shala

:          1965               Requested By: OWEN ANGUIANO 

Order Number: [id number]     Reading MD:   Kevin Cueto

                                 Measurements

Intervals                              Axis          

Rate:         68                       P:            50

WV:           153                      QRS:          9

QRSD:         98                       T:            34

QT:           410                                    

QTc:          437                                    

                           Interpretive Statements

SINUS RHYTHM

POOR R WAVE PROGRESSION

SIMILAR TO 19

Electronically Signed on 6- 21:28:41 EDT by Kevin Cueto

## 2020-06-19 ENCOUNTER — HOSPITAL ENCOUNTER (OUTPATIENT)
Dept: HOSPITAL 53 - M LAB REF | Age: 55
End: 2020-06-19
Attending: NURSE PRACTITIONER
Payer: COMMERCIAL

## 2020-06-19 DIAGNOSIS — E55.9: ICD-10-CM

## 2020-06-19 DIAGNOSIS — R73.9: Primary | ICD-10-CM

## 2020-06-19 DIAGNOSIS — Z13.9: ICD-10-CM

## 2020-06-19 DIAGNOSIS — E78.5: ICD-10-CM

## 2020-06-19 DIAGNOSIS — I10: ICD-10-CM

## 2020-06-19 LAB
25(OH)D3 SERPL-MCNC: 30.8 NG/ML (ref 30–100)
ALBUMIN SERPL BCG-MCNC: 3.8 GM/DL (ref 3.2–5.2)
ALT SERPL W P-5'-P-CCNC: 30 U/L (ref 12–78)
BASOPHILS # BLD AUTO: 0.1 10^3/UL (ref 0–0.2)
BASOPHILS NFR BLD AUTO: 0.5 % (ref 0–1)
BILIRUB SERPL-MCNC: 0.3 MG/DL (ref 0.2–1)
BUN SERPL-MCNC: 17 MG/DL (ref 7–18)
CALCIUM SERPL-MCNC: 8.9 MG/DL (ref 8.5–10.1)
CHLORIDE SERPL-SCNC: 108 MEQ/L (ref 98–107)
CHOLEST SERPL-MCNC: 196 MG/DL (ref ?–200)
CHOLEST/HDLC SERPL: 5.44 {RATIO} (ref ?–5)
CO2 SERPL-SCNC: 24 MEQ/L (ref 21–32)
CREAT SERPL-MCNC: 0.8 MG/DL (ref 0.55–1.3)
EOSINOPHIL # BLD AUTO: 0.3 10^3/UL (ref 0–0.5)
EOSINOPHIL NFR BLD AUTO: 3 % (ref 0–3)
EST. AVERAGE GLUCOSE BLD GHB EST-MCNC: 131 MG/DL (ref 60–110)
GFR SERPL CREATININE-BSD FRML MDRD: > 60 ML/MIN/{1.73_M2} (ref 51–?)
GLUCOSE SERPL-MCNC: 91 MG/DL (ref 70–100)
HCT VFR BLD AUTO: 41.8 % (ref 36–47)
HDLC SERPL-MCNC: 36 MG/DL (ref 40–?)
HGB BLD-MCNC: 13.9 G/DL (ref 12–15.5)
LDLC SERPL CALC-MCNC: 128 MG/DL (ref ?–100)
LYMPHOCYTES # BLD AUTO: 1.3 10^3/UL (ref 1.5–5)
LYMPHOCYTES NFR BLD AUTO: 13.4 % (ref 24–44)
MCH RBC QN AUTO: 29.5 PG (ref 27–33)
MCHC RBC AUTO-ENTMCNC: 33.3 G/DL (ref 32–36.5)
MCV RBC AUTO: 88.7 FL (ref 80–96)
MONOCYTES # BLD AUTO: 0.6 10^3/UL (ref 0–0.8)
MONOCYTES NFR BLD AUTO: 6.3 % (ref 0–5)
NEUTROPHILS # BLD AUTO: 7.5 10^3/UL (ref 1.5–8.5)
NEUTROPHILS NFR BLD AUTO: 76 % (ref 36–66)
NONHDLC SERPL-MCNC: 160 MG/DL
PLATELET # BLD AUTO: 332 10^3/UL (ref 150–450)
POTASSIUM SERPL-SCNC: 4.4 MEQ/L (ref 3.5–5.1)
PROT SERPL-MCNC: 7.8 GM/DL (ref 6.4–8.2)
RBC # BLD AUTO: 4.71 10^6/UL (ref 4–5.4)
SODIUM SERPL-SCNC: 140 MEQ/L (ref 136–145)
T4 FREE SERPL-MCNC: 1.27 NG/DL (ref 0.76–1.46)
TRIGL SERPL-MCNC: 159 MG/DL (ref ?–150)
TSH SERPL DL<=0.005 MIU/L-ACNC: 0.24 UIU/ML (ref 0.36–3.74)
WBC # BLD AUTO: 9.8 10^3/UL (ref 4–10)

## 2020-09-17 ENCOUNTER — HOSPITAL ENCOUNTER (OUTPATIENT)
Dept: HOSPITAL 53 - M PAIN | Age: 55
End: 2020-09-17
Attending: NURSE PRACTITIONER
Payer: COMMERCIAL

## 2020-09-17 DIAGNOSIS — M54.5: Primary | ICD-10-CM

## 2021-01-04 ENCOUNTER — HOSPITAL ENCOUNTER (OUTPATIENT)
Dept: HOSPITAL 53 - M PAIN | Age: 56
End: 2021-01-04
Attending: NURSE PRACTITIONER
Payer: COMMERCIAL

## 2021-01-04 DIAGNOSIS — E66.01: ICD-10-CM

## 2021-01-04 DIAGNOSIS — M25.512: Primary | ICD-10-CM

## 2021-01-04 DIAGNOSIS — K21.9: ICD-10-CM

## 2021-01-04 DIAGNOSIS — Z88.8: ICD-10-CM

## 2021-01-04 DIAGNOSIS — Z79.899: ICD-10-CM

## 2021-01-04 DIAGNOSIS — G89.29: ICD-10-CM

## 2021-01-04 DIAGNOSIS — Z79.82: ICD-10-CM

## 2021-01-04 DIAGNOSIS — Z88.1: ICD-10-CM
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
## 2021-02-25 ENCOUNTER — HOSPITAL ENCOUNTER (OUTPATIENT)
Dept: HOSPITAL 53 - M WUC | Age: 56
End: 2021-02-25
Attending: PHYSICIAN ASSISTANT
Payer: COMMERCIAL

## 2021-02-25 DIAGNOSIS — R06.02: ICD-10-CM

## 2021-02-25 DIAGNOSIS — R07.9: Primary | ICD-10-CM

## 2021-02-25 NOTE — REP
INDICATION:

CHEST PAIN, SOB.



COMPARISON:

Comparison chest x-ray Ayah 15, 2020.



TECHNIQUE:

Two views..



FINDINGS:

The lungs are symmetrically aerated and free of infiltrate.  Pleural angles are sharp.

Heart does not appear enlarged.  Pulmonary vasculature is not increased.  There are

chronic post traumatic changes in the shoulders bilaterally.  No acute bony

abnormality.



IMPRESSION:

No active cardiopulmonary disease..





<Electronically signed by Sourav Hahn > 02/25/21 1200

## 2021-04-05 ENCOUNTER — HOSPITAL ENCOUNTER (OUTPATIENT)
Dept: HOSPITAL 53 - M PAIN | Age: 56
End: 2021-04-05
Attending: NURSE PRACTITIONER
Payer: COMMERCIAL

## 2021-04-05 DIAGNOSIS — Z79.891: ICD-10-CM

## 2021-04-05 DIAGNOSIS — M25.512: Primary | ICD-10-CM

## 2021-04-05 DIAGNOSIS — M25.511: ICD-10-CM

## 2021-04-05 DIAGNOSIS — Z88.8: ICD-10-CM

## 2021-04-05 DIAGNOSIS — Z79.899: ICD-10-CM

## 2021-04-05 DIAGNOSIS — Z88.1: ICD-10-CM

## 2021-04-05 DIAGNOSIS — K21.9: ICD-10-CM

## 2021-04-05 DIAGNOSIS — Z79.82: ICD-10-CM

## 2021-04-05 DIAGNOSIS — I10: ICD-10-CM

## 2021-04-07 NOTE — ECWPNPC
PATIENT NAME: HERSON CHANCE

: 1965

GENDER: FEMALE

MRN: [id number]

VISIT DATE: 2021

DISCHARGE DATE: 21

VISIT LOCKED DATE TIME: 

PHYSICIAN: ROXIE DEY 

RESOURCE: ROXIE DEY 

 

           

           

REASON FOR APPOINTMENT

           

          1. 3 MONTH SHOULDER PAIN

           

HISTORY OF PRESENT ILLNESS

           

      GENERAL:

           - 55-YEAR-OLD FEMALE IN FOR CHRONIC PAIN FOLLOW-UP. SHE FEELS

          HER MEDICATIONS ARE HELPFUL AND DENIES MED SIDE EFFECTS AT THIS

          TIME. SHE RATES HER PAIN CURRENTLY AT A 4 OUT OF 10 AND DESCRIBES

          IT AS ACHING, AND CONTINUOUS.

           

      FALL RISK SCREENING:

      SCREENING

          : NO FALLS REPORTED IN THE LAST YEAR.

           

      PAIN SCREENING:

      PATIENT HAS A COMPLAINT OF ACUTE OR CHRONIC PAIN

           

           

          :YES

          LOCATION OF PAIN:BOTH SHOULDERS

          INTENSITY OF PAIN (SCALE OF 1 TO 10):4

          WHAT DOES YOUR PAIN FEEL LIKE:ACHING, CONTINOUS

          DURATION:CONTINOUS

          PAIN IS INCREASED BY:ACTIVITIES, PROLONGED STANDING

          PAIN IS DECREASED BY:USE OF PAIN MEDICATIONS OXYCODONE

           

      NURSING NOTE:

           -.

           

      PAIN CENTER INTAKE QUESTIONS:

      DO YOU HAVE A HISTORY OF MRSA?

           

           

          :NO

           

      DO YOU TAKE A BLOOD THINNERS?

           

           

          :NO

           

      DO YOU HAVE ANY BLEEDING DISORDERS?

           

           

          :NO

           

      ANY NEW NUMBNESS OR WEAKNESS IN YOUR LEGS OR ARMS?

           

           

          :NO

           

      ANY PACEMAKER,DEFIBRILLATOR, OR DORSAL COLUMN

          STIMULATOR?

           

           

          :NO

           

      DO YOU HAVE ANY RASHES OR OPEN SORES?

           

           

          :NO

           

      ARE YOU ALLERGIC TO IV DYE?

           

           

          :NO

           

      ARE YOU DIABETIC?

           

           

          :NO

           

      ANY NEW PROBLEMS WITH YOUR MEDICATIONS?

           

           

          :NO

           

      HAVE YOU RECEIVED A VACCINE IN THE PAST 30 DAYS?

           

           

          :NO

           

      DO YOU PLAN TO RECEIVE A VACCINE IN THE NEXT 21

          DAYS?

           

           

          :NO

           

      DO YOU NEED ANY PRESCRIPTION?

           

           

          :NO

           

      DO YOU TAKE ANY IMMUNOSUPPRESSIVE MEDICATIONS?

           

           

          :NO

           

      DO YOU HAVE ANY KIDNEY OR LIVER DISEASE?

           

           

          :NO

           

      IS THERE A CHANCE YOU COULD BE PREGNANT?

           

           

          :NO

           

      ARE YOU BREAST FEEDING?

           

           

          :NO

           

CURRENT MEDICATIONS

           

          TAKING MELOXICAM 7.5 MG TABLET 1 TABLET ORALLY ONCE A DAY

          TAKING ASPIRIN 325 MG TABLET 1 TABLET ORALLY ONCE A DAY

          TAKING VITAMIN C 1000 MG TABLET 1 TABLET ORALLY ONCE A DAY

          TAKING SERTRALINE  MG TABLET 1 TABLET ORALLY ONCE A DAY

          TAKING LISINOPRIL 10 MG TABLET 1 TABLET ORALLY ONCE A DAY

          TAKING PANTOPRAZOLE SODIUM 20 MG TABLET DELAYED RELEASE 1 TABLET

          ORALLY ONCE A DAY

          TAKING AMLODIPINE BESYLATE 10 MG TABLET 1 TABLET ORALLY ONCE A

          DAY, NOTES: NOT SURE OF DOSE

          TAKING CRANBERRY 300 MG TABLET AS DIRECTED ORALLY , NOTES: NOT

          SURE OF DOSE

          TAKING OXYCODONE HCL 5 MG TABLET 1 TABLET AS NEEDED ORALLY EVERY

          12 HRS AS NEEDED MDD 2 46 PILLS TO LAST 30 DAYS

          UNKNOWN VITAMIN D-3 25 MCG (1000 UT) CAPSULE 1 CAPSULE ORALLY

          ONCE A DAY

          UNKNOWN NORVASC 10MG TABLET ORAL

          UNKNOWN GABAPENTIN 600 MG TABLET 1 TABLET ORALLY ONCE A DAY

          UNKNOWN MAPAP 500 MG CAPSULE 1 CAPSULE AS NEEDED ORALLY EVERY 6

          HRS

          UNKNOWN PROTONIX 20 MG TABLET DELAYED RELEASE 1 TABLET ORALLY

          ONCE A DAY

          MEDICATION LIST REVIEWED AND RECONCILED WITH THE PATIENT

           

PAST MEDICAL HISTORY

           

          HYPERTENSION

          ACID REFLUX

           

ALLERGIES

           

          FLAGYL

          METFORMIN HCL

          LIPITOR

          BENADRYL

           

SOCIAL HISTORY

           

          GENERAL:

           

          TOBACCO USE

          ARE YOU A:NONSMOKER

          SMOKING CESSATION INFORMATION GIVEN2020

           

           

          LATEX QUESTIONNAIRE

          LATEX ALLERGY : HAVE YOU EVER DEVELOPED ANY TYPE OF REACTION

          AFTER HANDLING LATEX PRODUCTS SUCH AS RUBBER GLOVES, CONDOMS,

          DIAPHRAGMS, BALLOONS, SOCKS, OR UNDERWEAR?NO

          LATEX ALLERGY : HAVE YOU EVER DEVELOPED ANY TYPE OF REACTION

          DURING OR AFTER DENTAL APPOINTMENT, VAGINAL/RECTAL EXAMINATION,

          SURGICAL PROCEDURE, OR ANY OTHER EXPOSURE?NO

          LATEX RISK : HAVE YOU EVER HAD ANY DIFFICULTY BREATHING OR HIVES

          AFTER EATING OR HANDLING ANY FRUITS, OR VEGETABLES; SUCH AS KIWI,

          BANANAS, STONE FRUITS, OR CHESTNUTSNO

          LATEX RISK : DO YOU HAVE A PREVIOUS PERSONAL HISTORY OF MORE THAN

          NINE SURGERIES, SPINA BIFIDA, OR REPEATED CATHERIZATIONS? NO

          LATEX RISK : ARE YOU FREQUENTLY EXPOSED TO LATEX PRODUCTS IN YOUR

          OCCUPATION?NO

          DATE ASKED : 2021

           

           

          ALCOHOL USE: NO.

           

           

          ALCOHOL SCREENING

          DID YOU HAVE A DRINK CONTAINING ALCOHOL IN THE PAST YEAR?NO

          POINTS0

          INTERPRETATIONNEGATIVE

           

           

          RECREATIONAL DRUG USE

          DRUG USE?NO

           

           

          CAFFEINE

          CAFFEINE USE?YES COFFEE AND TEA, 1 CUP DAILY

           

           

          LANGUAGE

          LANGUAGES SPOKEN:ENGLISH

           

           

          LEARNING BARRIERS / SPECIAL NEEDS

          CHANGE FROM LAST VISIT?NO

          BARRIERS TO LEARNING?NO

          HEARING IMPAIRED?NO

          VISION IMPAIRED?NO

          COGNITIVELY IMPAIRED?NO

          READINESS TO LEARN?YES

          LEARNING PREFERENCES?NO

          LEARNING CAPABILITIES PRESENT?YES

          EMOTIONAL BARRIERS?NO

          SPECIAL DEVICES?YES

          :WALKER

           NEEDED?NO

           

           

          DOMESTIC VIOLENCE

          DO YOU FEEL SAFE IN YOUR ENVIRONMENT?YES

           

           

          OCCUPATION: DISABLED.

           

           

          MARITAL STATUS: .

           

           

          -

          PFS REFERRAL NEEDED?NO

          CLERGY REFERRAL NEEDED?NO

          PUBLIC HEALTH REFERRAL NEEDED?NO

          HAS THE PATIENT BEEN EDUCATED REGARDING HIS/HER PLAN OF CARE?YES

          HAS THE PATIENT BEEN EDUCATED REGARDING PAIN, THE RISK FOR PAIN,

          THE IMPORTANCE OF EFFECTIVE PAIN MANAGEMENT, AND THE PAIN

          ASSESSMENT PROCESS?YES

           

           

          ADVANCE DIRECTIVE

          ADVANCE DIRECTIVE DISCUSSED WITH PATIENT:YES STATES HE DOES NOT

          HAVE HCP AND DECLINES INFORMATION/ASSISTANCE WITH FORM.

           

REVIEW OF SYSTEMS

           

      CONSTITUTIONAL:

           

          ANY RECENT FEVER    NO . CHILLS    NO . WEIGHT CHANGE OF UNKNOWN

          REASONS    NO .

           

      GASTROENTEROLOGY:

           

          NEW UNEXPLAINABLE CHANGES IN BOWEL CONTROL    NO . CONSTIPATION  

           NO .

           

      GENITOURINARY:

           

          ANY NEW CHANGE IN BLADDER CONTROL?    NO .

           

      NEUROLOGY:

           

          NEW ONSET DIZZINESS OR NEUROLOGICAL CHANGES NOT MENTIONED    NO .

          NEW NUMBNESS OR PAIN PATTERNS NOT MENTIONED AND PERTINENT TO

          TODAY'S VISIT    NO .

           

      CARDIOLOGY:

           

          NEW CHEST PRESSURE    NO . PATIENT DENIES    NO .

           

      RESPIRATORY:

           

          UNEXPLAINABLE COUGH    NO . NEW SHORTNESS OF BREATH    NO .

           

VITAL SIGNS

           

           LBS, HT 55 IN, BMI 69.95 INDEX, /78 MM HG, HR 62

          /MIN, RR 20 /MIN, TEMP 98.5 F, OXYGEN SAT % 99%, SAFE IN ENV?

          (Y/N) YES, REVIEWED BY: TINY COKER MA.

           

EXAMINATION

           

      GENERAL EXAMINATION:

          GENERALNO ACUTE DISTRESS, WELL NOURISHED AND HYDRATED.

           

          PSYCHAPPROPRIATE MOOD AND AFFECT .

           

          LUNGS:CLEAR TO AUSCULTATION BILATERALLY, NO WHEEZES, RHONCHI,

          RALES.

           

          HEART:NO MURMURS, REGULAR RATE AND RHYTHM.

           

ASSESSMENTS

           

          PAIN IN LEFT SHOULDER - M25.512 (PRIMARY)

           

          PAIN IN RIGHT SHOULDER - M25.511

           

TREATMENT

           

      PAIN IN LEFT SHOULDER

          NOTES: 55-YEAR-OLD FEMALE IN FOR CHRONIC PAIN FOLLOW-UP. GIVEN

          PRESENTING SYMPTOMS RECOMMEND CONTINUATION OF CURRENT MEDICATION

          REGIMEN WITH FOLLOW-UP IN 3 MONTHS. PATIENT HAS EXPRESSED

          UNDERSTANDING OF AND WAS IN AGREEMENT WITH TREATMENT PLAN. GIVEN

          TIME TO ASK QUESTIONS AND EXPRESS CONCERNS.

           

          , [employer] REGISTRY REVIEWED AND DEMONSTRATES COMPLLIANCE. (REF #

          [id number] ) BRINGS IN MEDICATIONS WHICH IS APPROPRIATE FOR WHAT

          WAS DISPENSED. RECENT URINE TOXICOLOGY REVIEWED. NO UNAUTHORIZED

          MEDICATIONS. NO ILLICIT SUBSTANCES AND PRESCRIBED MEDICATIONS

          WERE PRESENT.

           

PROCEDURE CODES

           

           ESTABILISHED PATIENT [facility] CHARGE

           

DISPOSITION & COMMUNICATION

           

FOLLOW UP

           

          3 MONTHS (REASON: SHOULDER PAIN )

           

 

ELECTRONICALLY SIGNED BY DOLLY PASCUAL ON

          2021 AT 08:32 AM EDT

           

           

           

 

DISCLAIMER :

THIS IS A VISIT SUMMARY EXTRACTED FROM THE [employer] CHART.

IT IS NOT A COPY OF THE [employer] PROGRESS NOTE.

SCOTT

## 2021-04-22 ENCOUNTER — HOSPITAL ENCOUNTER (OUTPATIENT)
Dept: HOSPITAL 53 - M LAB REF | Age: 56
End: 2021-04-22
Attending: NURSE PRACTITIONER
Payer: COMMERCIAL

## 2021-04-22 DIAGNOSIS — I10: ICD-10-CM

## 2021-04-22 DIAGNOSIS — R73.9: ICD-10-CM

## 2021-04-22 DIAGNOSIS — K21.9: ICD-10-CM

## 2021-04-22 DIAGNOSIS — E66.01: Primary | ICD-10-CM

## 2021-04-22 LAB
25(OH)D3 SERPL-MCNC: 28.3 NG/ML (ref 30–100)
ALBUMIN SERPL BCG-MCNC: 3.9 GM/DL (ref 3.2–5.2)
ALT SERPL W P-5'-P-CCNC: 29 U/L (ref 12–78)
BASOPHILS # BLD AUTO: 0.1 10^3/UL (ref 0–0.2)
BASOPHILS NFR BLD AUTO: 0.7 % (ref 0–1)
BILIRUB SERPL-MCNC: 0.3 MG/DL (ref 0.2–1)
BUN SERPL-MCNC: 20 MG/DL (ref 7–18)
CALCIUM SERPL-MCNC: 9.2 MG/DL (ref 8.5–10.1)
CHLORIDE SERPL-SCNC: 107 MEQ/L (ref 98–107)
CHOLEST SERPL-MCNC: 232 MG/DL (ref ?–200)
CHOLEST/HDLC SERPL: 5.27 {RATIO} (ref ?–5)
CO2 SERPL-SCNC: 26 MEQ/L (ref 21–32)
CREAT SERPL-MCNC: 0.63 MG/DL (ref 0.55–1.3)
EOSINOPHIL # BLD AUTO: 0.3 10^3/UL (ref 0–0.5)
EOSINOPHIL NFR BLD AUTO: 3.2 % (ref 0–3)
EST. AVERAGE GLUCOSE BLD GHB EST-MCNC: 103 MG/DL (ref 60–110)
GFR SERPL CREATININE-BSD FRML MDRD: > 60 ML/MIN/{1.73_M2} (ref 51–?)
GLUCOSE SERPL-MCNC: 104 MG/DL (ref 70–100)
HCT VFR BLD AUTO: 43.7 % (ref 36–47)
HDLC SERPL-MCNC: 44 MG/DL (ref 40–?)
HGB BLD-MCNC: 14.3 G/DL (ref 12–15.5)
LDLC SERPL CALC-MCNC: 159 MG/DL (ref ?–100)
LYMPHOCYTES # BLD AUTO: 1.5 10^3/UL (ref 1.5–5)
LYMPHOCYTES NFR BLD AUTO: 15.4 % (ref 24–44)
MCH RBC QN AUTO: 29.7 PG (ref 27–33)
MCHC RBC AUTO-ENTMCNC: 32.7 G/DL (ref 32–36.5)
MCV RBC AUTO: 90.9 FL (ref 80–96)
MONOCYTES # BLD AUTO: 0.7 10^3/UL (ref 0–0.8)
MONOCYTES NFR BLD AUTO: 7.4 % (ref 2–8)
NEUTROPHILS # BLD AUTO: 6.9 10^3/UL (ref 1.5–8.5)
NEUTROPHILS NFR BLD AUTO: 72.6 % (ref 36–66)
NONHDLC SERPL-MCNC: 188 MG/DL
PLATELET # BLD AUTO: 328 10^3/UL (ref 150–450)
POTASSIUM SERPL-SCNC: 4.5 MEQ/L (ref 3.5–5.1)
PROT SERPL-MCNC: 7.7 GM/DL (ref 6.4–8.2)
RBC # BLD AUTO: 4.81 10^6/UL (ref 4–5.4)
SODIUM SERPL-SCNC: 139 MEQ/L (ref 136–145)
T4 FREE SERPL-MCNC: 1.01 NG/DL (ref 0.76–1.46)
TRIGL SERPL-MCNC: 146 MG/DL (ref ?–150)
TSH SERPL DL<=0.005 MIU/L-ACNC: 0.83 UIU/ML (ref 0.36–3.74)
WBC # BLD AUTO: 9.6 10^3/UL (ref 4–10)

## 2021-05-06 ENCOUNTER — HOSPITAL ENCOUNTER (EMERGENCY)
Dept: HOSPITAL 53 - M ED | Age: 56
Discharge: HOME | End: 2021-05-06
Payer: COMMERCIAL

## 2021-05-06 VITALS — HEIGHT: 66 IN | WEIGHT: 293 LBS | BODY MASS INDEX: 47.09 KG/M2

## 2021-05-06 VITALS — SYSTOLIC BLOOD PRESSURE: 132 MMHG | DIASTOLIC BLOOD PRESSURE: 86 MMHG

## 2021-05-06 DIAGNOSIS — I10: ICD-10-CM

## 2021-05-06 DIAGNOSIS — S52.612A: ICD-10-CM

## 2021-05-06 DIAGNOSIS — Z79.82: ICD-10-CM

## 2021-05-06 DIAGNOSIS — S52.502A: Primary | ICD-10-CM

## 2021-05-06 DIAGNOSIS — Z79.899: ICD-10-CM
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
## 2021-05-06 DIAGNOSIS — E66.8: ICD-10-CM

## 2021-05-06 DIAGNOSIS — E78.5: ICD-10-CM

## 2021-05-06 PROCEDURE — 73130 X-RAY EXAM OF HAND: CPT

## 2021-05-06 PROCEDURE — 73110 X-RAY EXAM OF WRIST: CPT

## 2021-05-06 PROCEDURE — 29125 APPL SHORT ARM SPLINT STATIC: CPT

## 2021-05-06 PROCEDURE — 99284 EMERGENCY DEPT VISIT MOD MDM: CPT

## 2021-05-06 PROCEDURE — 71046 X-RAY EXAM CHEST 2 VIEWS: CPT

## 2021-05-06 NOTE — REP
INDICATION:

trauma



COMPARISON:

None.



TECHNIQUE:

Four views left wrist.



FINDINGS:

There is a comminuted intra-articular fracture of the distal radius.  This is

nondisplaced.  There is a fracture of the ulnar styloid process which is only

minimally displaced.  No dislocation.



IMPRESSION:

Fractures of distal radius and ulna.





<Electronically signed by Sincere Gutierrez > 05/06/21 [id number]

## 2021-05-06 NOTE — REP
INDICATION:

trauma



COMPARISON:

02/25/2021.



TECHNIQUE:

PA/Lateral



FINDINGS:

Lungs: Clear, no infiltrate.

Heart: Slightly enlarged.

Mediastinum: Mediastinal silhouette unremarkable.

Pleural angles: Unremarkable..

Bones and soft tissues: There is again deformity of proximal right humerus noted.



IMPRESSION:

No acute pulmonary disease.





<Electronically signed by Sincere Gutierrez > 05/06/21 [id number]

## 2021-05-06 NOTE — REP
INDICATION:

trauma



COMPARISON:

None.



TECHNIQUE:

Four views left hand.



FINDINGS:

There is a comminuted intra-articular nondisplaced fracture of the distal end of the

radius.  There is a fracture of the ulnar styloid process which is only minimally

displaced.  No other acute fracture or dislocation is seen.



IMPRESSION:

Fractures of distal radius and ulna.





<Electronically signed by Sincere Gutierrez > 05/06/21 [id number]

## 2021-05-12 ENCOUNTER — HOSPITAL ENCOUNTER (EMERGENCY)
Dept: HOSPITAL 53 - M ED | Age: 56
Discharge: HOME | End: 2021-05-12
Payer: COMMERCIAL

## 2021-05-12 VITALS — SYSTOLIC BLOOD PRESSURE: 146 MMHG | DIASTOLIC BLOOD PRESSURE: 81 MMHG

## 2021-05-12 VITALS — WEIGHT: 293 LBS | BODY MASS INDEX: 47.09 KG/M2 | HEIGHT: 66 IN
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
## 2021-05-12 DIAGNOSIS — S22.42XA: Primary | ICD-10-CM
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
## 2021-05-12 DIAGNOSIS — Z79.82: ICD-10-CM

## 2021-05-12 DIAGNOSIS — Z88.8: ICD-10-CM

## 2021-05-12 DIAGNOSIS — Z79.899: ICD-10-CM

## 2021-05-12 RX ADMIN — HYDROCODONE BITARTRATE AND ACETAMINOPHEN ONE TAB: 5; 325 TABLET ORAL at 09:48

## 2021-05-12 NOTE — REP
INDICATION:

trauma



COMPARISON:

None.



TECHNIQUE:

Frontal view of the chest with multiple views of the left hemithorax.



FINDINGS:

Frontal view of the chest demonstrates no acute cardiopulmonary process, contusion,

effusion, or pneumothorax.  Traumatic changes involving the left shoulder and

visualized left humerus noted.

Acute versus old nondisplaced fracture along the anterolateral margin of the 5th rib

and posterior margin of the 7th rib warrant clinical correlation.



IMPRESSION:

Acute versus old left 5th and 7th rib fractures suspected and correlation is required.





<Electronically signed by Corey Blanca > 05/12/21 [id number]

## 2021-05-27 ENCOUNTER — HOSPITAL ENCOUNTER (EMERGENCY)
Dept: HOSPITAL 53 - M ED | Age: 56
Discharge: HOME | End: 2021-05-27
Payer: COMMERCIAL

## 2021-05-27 VITALS — WEIGHT: 293 LBS | BODY MASS INDEX: 47.09 KG/M2 | HEIGHT: 66 IN

## 2021-05-27 VITALS — SYSTOLIC BLOOD PRESSURE: 120 MMHG | DIASTOLIC BLOOD PRESSURE: 76 MMHG
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
## 2021-05-27 DIAGNOSIS — K21.9: ICD-10-CM

## 2021-05-27 DIAGNOSIS — Z88.8: ICD-10-CM
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
## 2021-05-27 DIAGNOSIS — Z79.899: ICD-10-CM

## 2021-05-27 DIAGNOSIS — Z79.82: ICD-10-CM

## 2021-05-27 DIAGNOSIS — I10: ICD-10-CM

## 2021-05-27 DIAGNOSIS — S61.212A: Primary | ICD-10-CM

## 2021-05-27 PROCEDURE — 12001 RPR S/N/AX/GEN/TRNK 2.5CM/<: CPT

## 2021-05-27 PROCEDURE — 99283 EMERGENCY DEPT VISIT LOW MDM: CPT

## 2021-05-27 PROCEDURE — 73130 X-RAY EXAM OF HAND: CPT

## 2021-05-27 NOTE — REP
INDICATION:

R/O FORIEGN BODY (GLASS), THAT MAY BE WITH HER HAND.



COMPARISON:

None.



TECHNIQUE:

Four views of the right hand.



FINDINGS:

Four views of the right hand demonstrate overall normal mineralization.  There appears

to be a dressing along the palm are surface of the hand and wrist..  No fracture or

subluxation is seen.  No opaque foreign body noted.





IMPRESSION:

Negative right hand series.



No opaque foreign body or fracture seen.





<Electronically signed by Sourav Hahn > 05/27/21 [id number] Yes

## 2021-07-06 ENCOUNTER — HOSPITAL ENCOUNTER (OUTPATIENT)
Dept: HOSPITAL 53 - M PAIN | Age: 56
End: 2021-07-06
Attending: NURSE PRACTITIONER
Payer: COMMERCIAL

## 2021-07-06 DIAGNOSIS — Z79.899: ICD-10-CM

## 2021-07-06 DIAGNOSIS — Z79.1: ICD-10-CM

## 2021-07-06 DIAGNOSIS — M25.511: ICD-10-CM

## 2021-07-06 DIAGNOSIS — M25.512: Primary | ICD-10-CM

## 2021-07-06 DIAGNOSIS — Z79.82: ICD-10-CM

## 2021-07-06 DIAGNOSIS — Z88.8: ICD-10-CM

## 2021-07-06 DIAGNOSIS — Z88.1: ICD-10-CM

## 2021-07-06 DIAGNOSIS — Z79.891: ICD-10-CM

## 2021-07-06 DIAGNOSIS — I10: ICD-10-CM

## 2021-07-06 DIAGNOSIS — K21.9: ICD-10-CM

## 2021-07-06 DIAGNOSIS — R29.6: ICD-10-CM
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
## 2021-07-26 ENCOUNTER — HOSPITAL ENCOUNTER (OUTPATIENT)
Dept: HOSPITAL 53 - M LAB REF | Age: 56
End: 2021-07-26
Attending: NURSE PRACTITIONER
Payer: COMMERCIAL

## 2021-07-26 DIAGNOSIS — E78.5: Primary | ICD-10-CM

## 2021-07-26 LAB
ALBUMIN SERPL BCG-MCNC: 3.7 GM/DL (ref 3.2–5.2)
ALT SERPL W P-5'-P-CCNC: 37 U/L (ref 12–78)
BASOPHILS # BLD AUTO: 0.1 10^3/UL (ref 0–0.2)
BASOPHILS NFR BLD AUTO: 0.8 % (ref 0–1)
BILIRUB SERPL-MCNC: 0.2 MG/DL (ref 0.2–1)
BUN SERPL-MCNC: 17 MG/DL (ref 7–18)
CALCIUM SERPL-MCNC: 9.1 MG/DL (ref 8.5–10.1)
CHLORIDE SERPL-SCNC: 106 MEQ/L (ref 98–107)
CHOLEST SERPL-MCNC: 197 MG/DL (ref ?–200)
CHOLEST/HDLC SERPL: 5.18 {RATIO} (ref ?–5)
CO2 SERPL-SCNC: 26 MEQ/L (ref 21–32)
CREAT SERPL-MCNC: 0.75 MG/DL (ref 0.55–1.3)
EOSINOPHIL # BLD AUTO: 0.5 10^3/UL (ref 0–0.5)
EOSINOPHIL NFR BLD AUTO: 5.3 % (ref 0–3)
GFR SERPL CREATININE-BSD FRML MDRD: > 60 ML/MIN/{1.73_M2} (ref 51–?)
GLUCOSE SERPL-MCNC: 96 MG/DL (ref 70–100)
HCT VFR BLD AUTO: 40.4 % (ref 36–47)
HDLC SERPL-MCNC: 38 MG/DL (ref 40–?)
HGB BLD-MCNC: 12.8 G/DL (ref 12–15.5)
LDLC SERPL CALC-MCNC: 118 MG/DL (ref ?–100)
LYMPHOCYTES # BLD AUTO: 1.4 10^3/UL (ref 1.5–5)
LYMPHOCYTES NFR BLD AUTO: 15.7 % (ref 24–44)
MCH RBC QN AUTO: 28.6 PG (ref 27–33)
MCHC RBC AUTO-ENTMCNC: 31.7 G/DL (ref 32–36.5)
MCV RBC AUTO: 90.2 FL (ref 80–96)
MONOCYTES # BLD AUTO: 0.9 10^3/UL (ref 0–0.8)
MONOCYTES NFR BLD AUTO: 9.5 % (ref 2–8)
NEUTROPHILS # BLD AUTO: 6.1 10^3/UL (ref 1.5–8.5)
NEUTROPHILS NFR BLD AUTO: 67.2 % (ref 36–66)
NONHDLC SERPL-MCNC: 159 MG/DL
PLATELET # BLD AUTO: 359 10^3/UL (ref 150–450)
POTASSIUM SERPL-SCNC: 4.9 MEQ/L (ref 3.5–5.1)
PROT SERPL-MCNC: 8.1 GM/DL (ref 6.4–8.2)
RBC # BLD AUTO: 4.48 10^6/UL (ref 4–5.4)
SODIUM SERPL-SCNC: 139 MEQ/L (ref 136–145)
TRIGL SERPL-MCNC: 205 MG/DL (ref ?–150)
WBC # BLD AUTO: 9.1 10^3/UL (ref 4–10)

## 2021-10-13 ENCOUNTER — HOSPITAL ENCOUNTER (EMERGENCY)
Dept: HOSPITAL 53 - M ED | Age: 56
Discharge: HOME | End: 2021-10-13
Payer: COMMERCIAL

## 2021-10-13 VITALS — DIASTOLIC BLOOD PRESSURE: 88 MMHG | SYSTOLIC BLOOD PRESSURE: 152 MMHG

## 2021-10-13 VITALS — WEIGHT: 293 LBS | HEIGHT: 66 IN | BODY MASS INDEX: 47.09 KG/M2

## 2021-10-13 DIAGNOSIS — Z87.891: ICD-10-CM

## 2021-10-13 DIAGNOSIS — J20.5: Primary | ICD-10-CM

## 2021-10-13 DIAGNOSIS — K21.9: ICD-10-CM

## 2021-10-13 DIAGNOSIS — Z88.8: ICD-10-CM

## 2021-10-13 DIAGNOSIS — Z79.899: ICD-10-CM

## 2021-10-13 DIAGNOSIS — I10: ICD-10-CM

## 2021-10-13 LAB — RSV RNA NPH QL NAA+PROBE: POSITIVE

## 2021-10-13 PROCEDURE — 99284 EMERGENCY DEPT VISIT MOD MDM: CPT

## 2021-10-13 PROCEDURE — 93005 ELECTROCARDIOGRAM TRACING: CPT

## 2021-10-13 PROCEDURE — 36415 COLL VENOUS BLD VENIPUNCTURE: CPT

## 2021-10-13 PROCEDURE — 87631 RESP VIRUS 3-5 TARGETS: CPT

## 2021-10-13 PROCEDURE — 84484 ASSAY OF TROPONIN QUANT: CPT

## 2021-10-13 PROCEDURE — 71045 X-RAY EXAM CHEST 1 VIEW: CPT

## 2021-10-13 NOTE — REP
INDICATION:

chest pain, cough



COMPARISON:

1221



TECHNIQUE:

Portable AP view of the chest



FINDINGS:

The mediastinum and cardiac silhouette are stable and within normal limits for

portable technique.  The lung fields are clear without acute consolidation, effusion,

or pneumothorax.  A vague 1 cm opacity overlies the periphery of the left upper lung

zone.  Skeletal structures are intact.



IMPRESSION:

No focal consolidation or effusion.

Subtle vague opacity in the left upper lung zone cannot be excluded.  Consider

follow-up outpatient chest CT for further investigation.





<Electronically signed by Corey Blanca > 10/13/21 0831

## 2021-10-14 NOTE — ECGEPIP
[facility] - ED

                                       

                                       Test Date:    2021-10-13

Pat Name:     HERSON CHANCE             Department:   

Patient ID:   [id number]                 Room:         -

Gender:       Female                   Technician:   HODGKINS

:          1965               Requested By: GUANACO BHATIA PA-C.

Order Number: [id number]     Reading MD:   Kevin Cueto

                                 Measurements

Intervals                              Axis          

Rate:         63                       P:            9

MA:           160                      QRS:          26

QRSD:         88                       T:            5

QT:           428                                    

QTc:          437                                    

                           Interpretive Statements

Normal sinus rhythm with sinus arrhythmia

POOR R WAVE PROGRESSION

NONSPECIFIC T WAVE ABNORMALITY(S)

SIMILAR TO 6/15/20

Electronically Signed on 10- 7:25:58 EDT by Kevin Cueto

## 2021-11-23 ENCOUNTER — HOSPITAL ENCOUNTER (OUTPATIENT)
Dept: HOSPITAL 53 - M PLAIMG | Age: 56
End: 2021-11-23
Attending: PHYSICIAN ASSISTANT
Payer: COMMERCIAL

## 2021-11-23 DIAGNOSIS — Z87.891: ICD-10-CM

## 2021-11-23 DIAGNOSIS — R91.1: Primary | ICD-10-CM

## 2021-11-23 NOTE — REP
INDICATION:

NODULE OF LUNG, NICOTINE DEPENDENCE IN REMISSION



COMPARISON:

None



TECHNIQUE:

Axial noncontrast images from the thoracic inlet to the upper abdomen with coronal and

sagittal reformations.



This CT examination was performed using the following dose reduction techniques:

Automated exposure control, adjustment of mA and/or kv according to the patient's

size, and use of iterative reconstruction technique.



FINDINGS:

Evaluation is significantly limited by motion artifact.  However, multiple

noncalcified round pulmonary nodules are identified bilaterally measuring up to

approximately 1.3 cm and most concerning for metastatic disease.  No focal

consolidation.  No effusion.  No pneumothorax.  Tracheobronchial tree is grossly

patent.  No obvious adenopathy.



The mediastinum demonstrates stable minimal atherosclerotic changes to the thoracic

aorta.  Heart is upper limits of normal.  No pericardial effusion.  Surrounding

musculoskeletal structures are intact.  Limited upper abdomen demonstrates normal

bilateral adrenal glands.



IMPRESSION:

Scattered bilateral pulmonary nodules up to 1.3 cm most concerning for underlying

metastatic disease differential diagnosis would include granulomatous disease.

Further investigation and correlation is required.





<Electronically signed by Corey Blanca > 11/23/21 1545

## 2021-12-08 ENCOUNTER — HOSPITAL ENCOUNTER (OUTPATIENT)
Dept: HOSPITAL 53 - M PAIN | Age: 56
End: 2021-12-08
Attending: ANESTHESIOLOGY
Payer: COMMERCIAL

## 2021-12-08 DIAGNOSIS — Z88.1: ICD-10-CM

## 2021-12-08 DIAGNOSIS — M79.18: Primary | ICD-10-CM

## 2021-12-08 DIAGNOSIS — Z79.891: ICD-10-CM

## 2021-12-08 DIAGNOSIS — Z87.891: ICD-10-CM

## 2021-12-08 DIAGNOSIS — I10: ICD-10-CM

## 2021-12-08 DIAGNOSIS — M25.512: ICD-10-CM

## 2021-12-08 DIAGNOSIS — R29.6: ICD-10-CM

## 2021-12-08 DIAGNOSIS — M25.511: ICD-10-CM

## 2021-12-08 DIAGNOSIS — Z88.8: ICD-10-CM

## 2021-12-08 DIAGNOSIS — K11.9: ICD-10-CM

## 2022-01-03 ENCOUNTER — HOSPITAL ENCOUNTER (OUTPATIENT)
Dept: HOSPITAL 53 - M LAB | Age: 57
End: 2022-01-03
Attending: STUDENT IN AN ORGANIZED HEALTH CARE EDUCATION/TRAINING PROGRAM
Payer: COMMERCIAL

## 2022-01-03 DIAGNOSIS — R91.8: Primary | ICD-10-CM

## 2022-01-06 ENCOUNTER — HOSPITAL ENCOUNTER (OUTPATIENT)
Dept: HOSPITAL 53 - M LAB REF | Age: 57
End: 2022-01-06
Attending: OTOLARYNGOLOGY
Payer: COMMERCIAL

## 2022-01-06 DIAGNOSIS — H92.11: Primary | ICD-10-CM

## 2022-02-22 ENCOUNTER — HOSPITAL ENCOUNTER (OUTPATIENT)
Dept: HOSPITAL 53 - M RAD | Age: 57
End: 2022-02-22
Attending: STUDENT IN AN ORGANIZED HEALTH CARE EDUCATION/TRAINING PROGRAM
Payer: COMMERCIAL

## 2022-02-22 DIAGNOSIS — R91.8: Primary | ICD-10-CM

## 2022-04-07 ENCOUNTER — HOSPITAL ENCOUNTER (OUTPATIENT)
Dept: HOSPITAL 53 - M LAB REF | Age: 57
End: 2022-04-07
Attending: OTOLARYNGOLOGY
Payer: COMMERCIAL

## 2022-04-07 DIAGNOSIS — H92.11: Primary | ICD-10-CM

## 2022-07-18 ENCOUNTER — HOSPITAL ENCOUNTER (OUTPATIENT)
Dept: HOSPITAL 53 - M WHC | Age: 57
End: 2022-07-18
Attending: FAMILY MEDICINE
Payer: COMMERCIAL

## 2022-07-18 DIAGNOSIS — R92.8: Primary | ICD-10-CM

## 2022-08-08 ENCOUNTER — HOSPITAL ENCOUNTER (OUTPATIENT)
Dept: HOSPITAL 53 - M LABSMTC | Age: 57
End: 2022-08-08
Attending: ANESTHESIOLOGY
Payer: COMMERCIAL

## 2022-08-08 DIAGNOSIS — Z01.818: Primary | ICD-10-CM

## 2022-08-08 DIAGNOSIS — Z11.52: ICD-10-CM

## 2022-08-23 ENCOUNTER — HOSPITAL ENCOUNTER (OUTPATIENT)
Dept: HOSPITAL 53 - M RAD | Age: 57
End: 2022-08-23
Attending: STUDENT IN AN ORGANIZED HEALTH CARE EDUCATION/TRAINING PROGRAM
Payer: COMMERCIAL

## 2022-08-23 DIAGNOSIS — I70.0: ICD-10-CM

## 2022-08-23 DIAGNOSIS — I25.10: ICD-10-CM

## 2022-08-23 DIAGNOSIS — R91.8: Primary | ICD-10-CM

## 2022-09-12 ENCOUNTER — HOSPITAL ENCOUNTER (OUTPATIENT)
Dept: HOSPITAL 53 - M PAIN | Age: 57
End: 2022-09-12
Attending: FAMILY MEDICINE
Payer: COMMERCIAL

## 2022-09-12 DIAGNOSIS — Z88.1: ICD-10-CM

## 2022-09-12 DIAGNOSIS — K21.9: ICD-10-CM

## 2022-09-12 DIAGNOSIS — Z79.891: ICD-10-CM

## 2022-09-12 DIAGNOSIS — R29.6: ICD-10-CM

## 2022-09-12 DIAGNOSIS — G89.29: Primary | ICD-10-CM

## 2022-09-12 DIAGNOSIS — Z79.899: ICD-10-CM

## 2022-09-12 DIAGNOSIS — Z88.8: ICD-10-CM

## 2022-09-12 DIAGNOSIS — Z90.49: ICD-10-CM

## 2022-09-12 DIAGNOSIS — I10: ICD-10-CM

## 2022-09-12 DIAGNOSIS — M79.18: ICD-10-CM

## 2022-09-12 DIAGNOSIS — Z87.891: ICD-10-CM

## 2022-10-20 ENCOUNTER — HOSPITAL ENCOUNTER (OUTPATIENT)
Dept: HOSPITAL 53 - M RAD | Age: 57
End: 2022-10-20
Attending: FAMILY MEDICINE
Payer: COMMERCIAL

## 2022-10-20 DIAGNOSIS — M79.10: Primary | ICD-10-CM

## 2022-11-10 ENCOUNTER — HOSPITAL ENCOUNTER (OUTPATIENT)
Dept: HOSPITAL 53 - M LABSMTC | Age: 57
End: 2022-11-10
Attending: ANESTHESIOLOGY
Payer: COMMERCIAL

## 2022-11-10 DIAGNOSIS — Z11.52: ICD-10-CM

## 2022-11-10 DIAGNOSIS — Z01.812: Primary | ICD-10-CM

## 2022-11-14 ENCOUNTER — HOSPITAL ENCOUNTER (OUTPATIENT)
Dept: HOSPITAL 53 - M PAIN | Age: 57
End: 2022-11-14
Attending: ANESTHESIOLOGY
Payer: COMMERCIAL

## 2022-11-14 DIAGNOSIS — Z79.899: ICD-10-CM

## 2022-11-14 DIAGNOSIS — Z79.82: ICD-10-CM

## 2022-11-14 DIAGNOSIS — Z79.891: ICD-10-CM

## 2022-11-14 DIAGNOSIS — I10: ICD-10-CM

## 2022-11-14 DIAGNOSIS — M79.18: Primary | ICD-10-CM

## 2022-11-14 DIAGNOSIS — Z79.1: ICD-10-CM

## 2022-11-14 DIAGNOSIS — R29.6: ICD-10-CM

## 2022-11-14 DIAGNOSIS — M25.511: ICD-10-CM

## 2022-11-14 DIAGNOSIS — Z88.1: ICD-10-CM

## 2022-11-14 DIAGNOSIS — Z87.891: ICD-10-CM

## 2022-11-14 DIAGNOSIS — Z88.8: ICD-10-CM

## 2022-11-14 DIAGNOSIS — K21.9: ICD-10-CM

## 2022-11-14 DIAGNOSIS — M25.512: ICD-10-CM

## 2022-11-14 PROCEDURE — 20552 NJX 1/MLT TRIGGER POINT 1/2: CPT

## 2022-12-06 ENCOUNTER — HOSPITAL ENCOUNTER (OUTPATIENT)
Dept: HOSPITAL 53 - M PAIN | Age: 57
End: 2022-12-06
Attending: ANESTHESIOLOGY
Payer: COMMERCIAL

## 2022-12-06 DIAGNOSIS — M25.512: ICD-10-CM

## 2022-12-06 DIAGNOSIS — Z79.899: ICD-10-CM

## 2022-12-06 DIAGNOSIS — Z79.82: ICD-10-CM

## 2022-12-06 DIAGNOSIS — G89.29: Primary | ICD-10-CM

## 2022-12-06 DIAGNOSIS — Z79.1: ICD-10-CM

## 2022-12-06 DIAGNOSIS — R29.6: ICD-10-CM

## 2022-12-06 DIAGNOSIS — Z88.8: ICD-10-CM

## 2022-12-06 DIAGNOSIS — M25.511: ICD-10-CM

## 2022-12-06 DIAGNOSIS — I10: ICD-10-CM

## 2022-12-06 DIAGNOSIS — Z87.891: ICD-10-CM

## 2022-12-06 DIAGNOSIS — Z88.1: ICD-10-CM

## 2022-12-06 DIAGNOSIS — Z87.81: ICD-10-CM

## 2022-12-06 DIAGNOSIS — M47.816: ICD-10-CM

## 2022-12-06 DIAGNOSIS — Z79.891: ICD-10-CM

## 2022-12-06 DIAGNOSIS — K21.9: ICD-10-CM

## 2023-05-12 ENCOUNTER — HOSPITAL ENCOUNTER (OUTPATIENT)
Dept: HOSPITAL 53 - M PAIN | Age: 58
End: 2023-05-12
Attending: FAMILY MEDICINE
Payer: COMMERCIAL

## 2023-05-12 DIAGNOSIS — M25.511: ICD-10-CM

## 2023-05-12 DIAGNOSIS — Z87.891: ICD-10-CM

## 2023-05-12 DIAGNOSIS — Z79.1: ICD-10-CM

## 2023-05-12 DIAGNOSIS — Z88.8: ICD-10-CM

## 2023-05-12 DIAGNOSIS — I10: ICD-10-CM

## 2023-05-12 DIAGNOSIS — K21.9: ICD-10-CM

## 2023-05-12 DIAGNOSIS — Z79.899: ICD-10-CM

## 2023-05-12 DIAGNOSIS — Z79.891: ICD-10-CM

## 2023-05-12 DIAGNOSIS — M79.10: Primary | ICD-10-CM

## 2023-05-12 DIAGNOSIS — Z88.1: ICD-10-CM

## 2023-05-12 DIAGNOSIS — M25.512: ICD-10-CM

## 2023-07-10 ENCOUNTER — HOSPITAL ENCOUNTER (OUTPATIENT)
Dept: HOSPITAL 53 - M RAD | Age: 58
End: 2023-07-10
Attending: STUDENT IN AN ORGANIZED HEALTH CARE EDUCATION/TRAINING PROGRAM
Payer: COMMERCIAL

## 2023-07-10 DIAGNOSIS — R91.8: Primary | ICD-10-CM

## 2023-09-07 ENCOUNTER — HOSPITAL ENCOUNTER (OUTPATIENT)
Dept: HOSPITAL 53 - M LAB REF | Age: 58
End: 2023-09-07
Attending: FAMILY MEDICINE
Payer: COMMERCIAL

## 2023-09-07 DIAGNOSIS — R42: Primary | ICD-10-CM

## 2023-09-07 DIAGNOSIS — I10: ICD-10-CM

## 2023-09-07 LAB
ALBUMIN SERPL BCG-MCNC: 3.6 G/DL (ref 3.2–5.2)
ALP SERPL-CCNC: 91 U/L (ref 46–116)
ALT SERPL W P-5'-P-CCNC: 21 U/L (ref 7–40)
AST SERPL-CCNC: < 8 U/L (ref ?–34)
BASOPHILS # BLD AUTO: 0.1 10^3/UL (ref 0–0.2)
BASOPHILS NFR BLD AUTO: 0.8 % (ref 0–1)
BILIRUB SERPL-MCNC: 0.2 MG/DL (ref 0.3–1.2)
BUN SERPL-MCNC: 26 MG/DL (ref 9–23)
CALCIUM SERPL-MCNC: 9.5 MG/DL (ref 8.5–10.1)
CHLORIDE SERPL-SCNC: 108 MMOL/L (ref 98–107)
CHOLEST SERPL-MCNC: 193 MG/DL (ref ?–200)
CHOLEST/HDLC SERPL: 4.83 {RATIO} (ref ?–5)
CO2 SERPL-SCNC: 26 MMOL/L (ref 20–31)
CREAT SERPL-MCNC: 0.65 MG/DL (ref 0.55–1.3)
EOSINOPHIL # BLD AUTO: 0.3 10^3/UL (ref 0–0.5)
EOSINOPHIL NFR BLD AUTO: 3.3 % (ref 0–3)
GFR SERPL CREATININE-BSD FRML MDRD: > 60 ML/MIN/{1.73_M2} (ref 51–?)
GLUCOSE SERPL-MCNC: 103 MG/DL (ref 60–100)
HCT VFR BLD AUTO: 42.2 % (ref 36–47)
HDLC SERPL-MCNC: 39.9 MG/DL (ref 40–?)
HGB BLD-MCNC: 13.7 G/DL (ref 12–15.5)
LDLC SERPL CALC-MCNC: 122.3 MG/DL (ref ?–100)
LYMPHOCYTES # BLD AUTO: 1.2 10^3/UL (ref 1.5–5)
LYMPHOCYTES NFR BLD AUTO: 13.1 % (ref 24–44)
MCH RBC QN AUTO: 28.7 PG (ref 27–33)
MCHC RBC AUTO-ENTMCNC: 32.5 G/DL (ref 32–36.5)
MCV RBC AUTO: 88.3 FL (ref 80–96)
MONOCYTES # BLD AUTO: 0.7 10^3/UL (ref 0–0.8)
MONOCYTES NFR BLD AUTO: 7.2 % (ref 2–8)
NEUTROPHILS # BLD AUTO: 7 10^3/UL (ref 1.5–8.5)
NEUTROPHILS NFR BLD AUTO: 74.6 % (ref 36–66)
NONHDLC SERPL-MCNC: 153.1 MG/DL
PLATELET # BLD AUTO: 314 10^3/UL (ref 150–450)
POTASSIUM SERPL-SCNC: 4.3 MMOL/L (ref 3.5–5.1)
PROT SERPL-MCNC: 7.4 G/DL (ref 5.7–8.2)
RBC # BLD AUTO: 4.78 10^6/UL (ref 4–5.4)
SODIUM SERPL-SCNC: 139 MMOL/L (ref 136–145)
T4 FREE SERPL-MCNC: 1.11 NG/DL (ref 0.89–1.76)
TRIGL SERPL-MCNC: 154 MG/DL (ref ?–150)
TSH SERPL DL<=0.005 MIU/L-ACNC: 0.83 UIU/ML (ref 0.55–4.78)
WBC # BLD AUTO: 9.3 10^3/UL (ref 4–10)

## 2023-10-03 ENCOUNTER — HOSPITAL ENCOUNTER (OUTPATIENT)
Dept: HOSPITAL 53 - M PAIN | Age: 58
End: 2023-10-03
Attending: ANESTHESIOLOGY
Payer: COMMERCIAL

## 2023-10-03 DIAGNOSIS — M25.512: ICD-10-CM

## 2023-10-03 DIAGNOSIS — Z79.891: ICD-10-CM

## 2023-10-03 DIAGNOSIS — Z88.1: ICD-10-CM

## 2023-10-03 DIAGNOSIS — I10: ICD-10-CM

## 2023-10-03 DIAGNOSIS — M79.18: Primary | ICD-10-CM

## 2023-10-03 DIAGNOSIS — M25.511: ICD-10-CM

## 2023-10-03 DIAGNOSIS — Z88.8: ICD-10-CM

## 2023-10-03 DIAGNOSIS — Z87.891: ICD-10-CM

## 2023-10-03 DIAGNOSIS — K21.9: ICD-10-CM

## 2023-10-03 DIAGNOSIS — R29.6: ICD-10-CM

## 2023-10-03 DIAGNOSIS — Z79.82: ICD-10-CM

## 2023-10-03 PROCEDURE — 20552 NJX 1/MLT TRIGGER POINT 1/2: CPT

## 2023-12-12 ENCOUNTER — HOSPITAL ENCOUNTER (OUTPATIENT)
Dept: HOSPITAL 53 - M PAIN | Age: 58
End: 2023-12-12
Attending: FAMILY MEDICINE
Payer: COMMERCIAL

## 2023-12-12 DIAGNOSIS — Z88.1: ICD-10-CM

## 2023-12-12 DIAGNOSIS — M79.18: ICD-10-CM

## 2023-12-12 DIAGNOSIS — G89.29: ICD-10-CM

## 2023-12-12 DIAGNOSIS — Z79.891: ICD-10-CM

## 2023-12-12 DIAGNOSIS — Z79.899: ICD-10-CM

## 2023-12-12 DIAGNOSIS — M25.512: ICD-10-CM

## 2023-12-12 DIAGNOSIS — M79.12: Primary | ICD-10-CM

## 2023-12-12 DIAGNOSIS — Z79.1: ICD-10-CM

## 2023-12-12 DIAGNOSIS — Z87.891: ICD-10-CM

## 2023-12-12 DIAGNOSIS — M25.511: ICD-10-CM

## 2023-12-12 DIAGNOSIS — I10: ICD-10-CM

## 2023-12-12 DIAGNOSIS — Z88.8: ICD-10-CM

## 2023-12-12 DIAGNOSIS — K21.9: ICD-10-CM

## 2023-12-12 DIAGNOSIS — Z79.82: ICD-10-CM

## 2023-12-19 ENCOUNTER — HOSPITAL ENCOUNTER (EMERGENCY)
Dept: HOSPITAL 53 - M ED | Age: 58
Discharge: HOME | End: 2023-12-19
Payer: COMMERCIAL

## 2023-12-19 VITALS — SYSTOLIC BLOOD PRESSURE: 134 MMHG | DIASTOLIC BLOOD PRESSURE: 62 MMHG

## 2023-12-19 VITALS — HEIGHT: 66 IN | WEIGHT: 293 LBS | BODY MASS INDEX: 47.09 KG/M2

## 2023-12-19 VITALS — OXYGEN SATURATION: 98 % | DIASTOLIC BLOOD PRESSURE: 75 MMHG | TEMPERATURE: 97.9 F | SYSTOLIC BLOOD PRESSURE: 177 MMHG

## 2023-12-19 DIAGNOSIS — Z79.899: ICD-10-CM

## 2023-12-19 DIAGNOSIS — N39.0: Primary | ICD-10-CM

## 2023-12-19 DIAGNOSIS — Z79.82: ICD-10-CM

## 2023-12-19 DIAGNOSIS — Z88.8: ICD-10-CM

## 2023-12-19 DIAGNOSIS — K21.9: ICD-10-CM

## 2023-12-19 DIAGNOSIS — K57.30: ICD-10-CM

## 2023-12-19 DIAGNOSIS — K76.0: ICD-10-CM

## 2023-12-19 DIAGNOSIS — I10: ICD-10-CM

## 2023-12-19 DIAGNOSIS — N28.1: ICD-10-CM

## 2023-12-19 LAB
ALBUMIN SERPL BCG-MCNC: 4 G/DL (ref 3.2–5.2)
ALP SERPL-CCNC: 97 U/L (ref 46–116)
ALT SERPL W P-5'-P-CCNC: 27 U/L (ref 7–40)
AST SERPL-CCNC: 34 U/L (ref ?–34)
BASOPHILS # BLD AUTO: 0.1 10^3/UL (ref 0–0.2)
BASOPHILS NFR BLD AUTO: 0.5 % (ref 0–1)
BILIRUB CONJ SERPL-MCNC: < 0.1 MG/DL (ref ?–0.4)
BILIRUB SERPL-MCNC: 0.4 MG/DL (ref 0.3–1.2)
EOSINOPHIL # BLD AUTO: 0.3 10^3/UL (ref 0–0.5)
EOSINOPHIL NFR BLD AUTO: 2 % (ref 0–3)
HCT VFR BLD AUTO: 44.3 % (ref 36–47)
HGB BLD-MCNC: 14.6 G/DL (ref 12–15.5)
LIPASE SERPL-CCNC: 32 U/L (ref 12–53)
LYMPHOCYTES # BLD AUTO: 1.6 10^3/UL (ref 1.5–5)
LYMPHOCYTES NFR BLD AUTO: 12.9 % (ref 24–44)
MCH RBC QN AUTO: 29.2 PG (ref 27–33)
MCHC RBC AUTO-ENTMCNC: 33 G/DL (ref 32–36.5)
MCV RBC AUTO: 88.6 FL (ref 80–96)
MONOCYTES # BLD AUTO: 0.9 10^3/UL (ref 0–0.8)
MONOCYTES NFR BLD AUTO: 7.2 % (ref 2–8)
NEUTROPHILS # BLD AUTO: 9.4 10^3/UL (ref 1.5–8.5)
NEUTROPHILS NFR BLD AUTO: 76.7 % (ref 36–66)
PLATELET # BLD AUTO: 346 10^3/UL (ref 150–450)
PROT SERPL-MCNC: 7.8 G/DL (ref 5.7–8.2)
RBC # BLD AUTO: 5 10^6/UL (ref 4–5.4)
WBC # BLD AUTO: 12.2 10^3/UL (ref 4–10)

## 2023-12-19 PROCEDURE — 99284 EMERGENCY DEPT VISIT MOD MDM: CPT

## 2023-12-19 PROCEDURE — 74177 CT ABD & PELVIS W/CONTRAST: CPT

## 2023-12-19 PROCEDURE — 83690 ASSAY OF LIPASE: CPT

## 2023-12-19 PROCEDURE — 81001 URINALYSIS AUTO W/SCOPE: CPT

## 2023-12-19 PROCEDURE — 80076 HEPATIC FUNCTION PANEL: CPT

## 2023-12-19 PROCEDURE — 85025 COMPLETE CBC W/AUTO DIFF WBC: CPT

## 2023-12-19 PROCEDURE — 96374 THER/PROPH/DIAG INJ IV PUSH: CPT

## 2023-12-19 PROCEDURE — 96375 TX/PRO/DX INJ NEW DRUG ADDON: CPT

## 2023-12-19 PROCEDURE — 96361 HYDRATE IV INFUSION ADD-ON: CPT

## 2023-12-19 PROCEDURE — 87086 URINE CULTURE/COLONY COUNT: CPT

## 2023-12-19 PROCEDURE — 80047 BASIC METABLC PNL IONIZED CA: CPT

## 2024-02-08 ENCOUNTER — HOSPITAL ENCOUNTER (OUTPATIENT)
Dept: HOSPITAL 53 - M PAIN | Age: 59
End: 2024-02-08
Attending: ANESTHESIOLOGY
Payer: COMMERCIAL

## 2024-02-08 DIAGNOSIS — Z79.899: ICD-10-CM

## 2024-02-08 DIAGNOSIS — R29.6: ICD-10-CM

## 2024-02-08 DIAGNOSIS — Z79.891: ICD-10-CM

## 2024-02-08 DIAGNOSIS — Z79.1: ICD-10-CM

## 2024-02-08 DIAGNOSIS — Z79.82: ICD-10-CM

## 2024-02-08 DIAGNOSIS — M79.18: ICD-10-CM

## 2024-02-08 DIAGNOSIS — M79.12: Primary | ICD-10-CM

## 2024-02-08 DIAGNOSIS — Z88.1: ICD-10-CM

## 2024-02-08 DIAGNOSIS — I10: ICD-10-CM

## 2024-02-08 DIAGNOSIS — Z87.891: ICD-10-CM

## 2024-02-08 DIAGNOSIS — K21.9: ICD-10-CM

## 2024-02-08 DIAGNOSIS — G89.29: ICD-10-CM

## 2024-02-08 DIAGNOSIS — Z88.8: ICD-10-CM

## 2024-02-08 PROCEDURE — 20552 NJX 1/MLT TRIGGER POINT 1/2: CPT

## 2024-03-27 ENCOUNTER — HOSPITAL ENCOUNTER (EMERGENCY)
Dept: HOSPITAL 53 - M ED | Age: 59
Discharge: HOME | End: 2024-03-27
Payer: COMMERCIAL

## 2024-03-27 VITALS — WEIGHT: 293 LBS | BODY MASS INDEX: 47.09 KG/M2 | HEIGHT: 66 IN

## 2024-03-27 VITALS — SYSTOLIC BLOOD PRESSURE: 166 MMHG | OXYGEN SATURATION: 96 % | TEMPERATURE: 97 F | DIASTOLIC BLOOD PRESSURE: 93 MMHG

## 2024-03-27 DIAGNOSIS — I10: ICD-10-CM

## 2024-03-27 DIAGNOSIS — Z79.1: ICD-10-CM

## 2024-03-27 DIAGNOSIS — Z79.899: ICD-10-CM

## 2024-03-27 DIAGNOSIS — U07.1: Primary | ICD-10-CM

## 2024-03-27 DIAGNOSIS — K21.9: ICD-10-CM

## 2024-03-27 DIAGNOSIS — Z88.8: ICD-10-CM

## 2024-03-27 LAB
ALBUMIN SERPL BCG-MCNC: 3.7 G/DL (ref 3.2–5.2)
ALP SERPL-CCNC: 89 U/L (ref 46–116)
ALT SERPL W P-5'-P-CCNC: 25 U/L (ref 7–40)
AST SERPL-CCNC: 17 U/L (ref ?–34)
BASOPHILS # BLD AUTO: 0.1 10^3/UL (ref 0–0.2)
BASOPHILS NFR BLD AUTO: 0.6 % (ref 0–1)
BILIRUB CONJ SERPL-MCNC: 0.1 MG/DL (ref ?–0.4)
BILIRUB SERPL-MCNC: 0.4 MG/DL (ref 0.3–1.2)
BUN SERPL-MCNC: 16 MG/DL (ref 9–23)
CALCIUM SERPL-MCNC: 9.1 MG/DL (ref 8.5–10.1)
CHLORIDE SERPL-SCNC: 103 MMOL/L (ref 98–107)
CO2 SERPL-SCNC: 31 MMOL/L (ref 20–31)
CREAT SERPL-MCNC: 0.8 MG/DL (ref 0.55–1.3)
EOSINOPHIL # BLD AUTO: 0.4 10^3/UL (ref 0–0.5)
EOSINOPHIL NFR BLD AUTO: 3.4 % (ref 0–3)
GFR SERPL CREATININE-BSD FRML MDRD: > 60 ML/MIN/{1.73_M2} (ref 51–?)
GLUCOSE SERPL-MCNC: 98 MG/DL (ref 60–100)
HCT VFR BLD AUTO: 41.4 % (ref 36–47)
HGB BLD-MCNC: 14.1 G/DL (ref 12–15.5)
LIPASE SERPL-CCNC: 29 U/L (ref 12–53)
LYMPHOCYTES # BLD AUTO: 1.6 10^3/UL (ref 1.5–5)
LYMPHOCYTES NFR BLD AUTO: 13.7 % (ref 24–44)
MCH RBC QN AUTO: 29.7 PG (ref 27–33)
MCHC RBC AUTO-ENTMCNC: 34.1 G/DL (ref 32–36.5)
MCV RBC AUTO: 87.2 FL (ref 80–96)
MONOCYTES # BLD AUTO: 1.1 10^3/UL (ref 0–0.8)
MONOCYTES NFR BLD AUTO: 9.4 % (ref 2–8)
NEUTROPHILS # BLD AUTO: 8.6 10^3/UL (ref 1.5–8.5)
NEUTROPHILS NFR BLD AUTO: 72.5 % (ref 36–66)
PLATELET # BLD AUTO: 280 10^3/UL (ref 150–450)
POTASSIUM SERPL-SCNC: 3.1 MMOL/L (ref 3.5–5.1)
PROT SERPL-MCNC: 7 G/DL (ref 5.7–8.2)
RBC # BLD AUTO: 4.75 10^6/UL (ref 4–5.4)
RSV RNA NPH QL NAA+PROBE: NEGATIVE
SODIUM SERPL-SCNC: 142 MMOL/L (ref 136–145)
WBC # BLD AUTO: 11.9 10^3/UL (ref 4–10)

## 2024-03-27 PROCEDURE — 83690 ASSAY OF LIPASE: CPT

## 2024-03-27 PROCEDURE — 81001 URINALYSIS AUTO W/SCOPE: CPT

## 2024-03-27 PROCEDURE — 99284 EMERGENCY DEPT VISIT MOD MDM: CPT

## 2024-03-27 PROCEDURE — 72131 CT LUMBAR SPINE W/O DYE: CPT

## 2024-03-27 PROCEDURE — 96374 THER/PROPH/DIAG INJ IV PUSH: CPT

## 2024-03-27 PROCEDURE — 96361 HYDRATE IV INFUSION ADD-ON: CPT

## 2024-03-27 PROCEDURE — 96375 TX/PRO/DX INJ NEW DRUG ADDON: CPT

## 2024-03-27 PROCEDURE — 93005 ELECTROCARDIOGRAM TRACING: CPT

## 2024-03-27 PROCEDURE — 83735 ASSAY OF MAGNESIUM: CPT

## 2024-03-27 PROCEDURE — 80048 BASIC METABOLIC PNL TOTAL CA: CPT

## 2024-03-27 PROCEDURE — 87631 RESP VIRUS 3-5 TARGETS: CPT

## 2024-03-27 PROCEDURE — 85025 COMPLETE CBC W/AUTO DIFF WBC: CPT

## 2024-03-27 PROCEDURE — 80076 HEPATIC FUNCTION PANEL: CPT

## 2024-03-27 PROCEDURE — 74177 CT ABD & PELVIS W/CONTRAST: CPT

## 2024-03-27 RX ADMIN — PROMETHAZINE HYDROCHLORIDE ONE MG: 25 INJECTION INTRAMUSCULAR; INTRAVENOUS at 19:45

## 2024-03-27 RX ADMIN — POTASSIUM CHLORIDE ONE MLS/HR: 7.46 INJECTION, SOLUTION INTRAVENOUS at 19:45

## 2024-03-27 RX ADMIN — KETOROLAC TROMETHAMINE ONE MG: 30 INJECTION, SOLUTION INTRAMUSCULAR at 20:30

## 2024-03-27 RX ADMIN — SODIUM CHLORIDE ONE MLS/HR: 9 INJECTION, SOLUTION INTRAVENOUS at 19:45

## 2024-04-08 ENCOUNTER — HOSPITAL ENCOUNTER (EMERGENCY)
Dept: HOSPITAL 53 - M ED | Age: 59
Discharge: HOME | End: 2024-04-08
Payer: COMMERCIAL

## 2024-04-08 VITALS — SYSTOLIC BLOOD PRESSURE: 152 MMHG | TEMPERATURE: 98.9 F | OXYGEN SATURATION: 96 % | DIASTOLIC BLOOD PRESSURE: 76 MMHG

## 2024-04-08 VITALS — WEIGHT: 293 LBS | BODY MASS INDEX: 47.09 KG/M2 | HEIGHT: 66 IN

## 2024-04-08 DIAGNOSIS — Z79.1: ICD-10-CM

## 2024-04-08 DIAGNOSIS — F32.A: ICD-10-CM

## 2024-04-08 DIAGNOSIS — Z88.8: ICD-10-CM

## 2024-04-08 DIAGNOSIS — Z79.899: ICD-10-CM

## 2024-04-08 DIAGNOSIS — B34.8: Primary | ICD-10-CM

## 2024-04-08 DIAGNOSIS — I10: ICD-10-CM

## 2024-04-08 LAB
ALBUMIN SERPL BCG-MCNC: 3.5 G/DL (ref 3.2–5.2)
ALP SERPL-CCNC: 80 U/L (ref 46–116)
ALT SERPL W P-5'-P-CCNC: 33 U/L (ref 7–40)
AST SERPL-CCNC: 39 U/L (ref ?–34)
BASOPHILS # BLD AUTO: 0 10^3/UL (ref 0–0.2)
BASOPHILS NFR BLD AUTO: 0.3 % (ref 0–1)
BILIRUB CONJ SERPL-MCNC: < 0.1 MG/DL (ref ?–0.4)
BILIRUB SERPL-MCNC: 0.4 MG/DL (ref 0.3–1.2)
EOSINOPHIL # BLD AUTO: 0.3 10^3/UL (ref 0–0.5)
EOSINOPHIL NFR BLD AUTO: 2.4 % (ref 0–3)
HCT VFR BLD AUTO: 44.1 % (ref 36–47)
HGB BLD-MCNC: 14.7 G/DL (ref 12–15.5)
LIPASE SERPL-CCNC: 36 U/L (ref 12–53)
LYMPHOCYTES # BLD AUTO: 0.5 10^3/UL (ref 1.5–5)
LYMPHOCYTES NFR BLD AUTO: 3.4 % (ref 24–44)
MCH RBC QN AUTO: 29.2 PG (ref 27–33)
MCHC RBC AUTO-ENTMCNC: 33.3 G/DL (ref 32–36.5)
MCV RBC AUTO: 87.7 FL (ref 80–96)
MONOCYTES # BLD AUTO: 0.7 10^3/UL (ref 0–0.8)
MONOCYTES NFR BLD AUTO: 5 % (ref 2–8)
NEUTROPHILS # BLD AUTO: 12.6 10^3/UL (ref 1.5–8.5)
NEUTROPHILS NFR BLD AUTO: 88.3 % (ref 36–66)
PLATELET # BLD AUTO: 247 10^3/UL (ref 150–450)
PROT SERPL-MCNC: 7.4 G/DL (ref 5.7–8.2)
RBC # BLD AUTO: 5.03 10^6/UL (ref 4–5.4)
WBC # BLD AUTO: 14.3 10^3/UL (ref 4–10)

## 2024-04-08 PROCEDURE — 74177 CT ABD & PELVIS W/CONTRAST: CPT

## 2024-04-08 PROCEDURE — 99284 EMERGENCY DEPT VISIT MOD MDM: CPT

## 2024-04-08 PROCEDURE — 87507 IADNA-DNA/RNA PROBE TQ 12-25: CPT

## 2024-04-08 PROCEDURE — 96374 THER/PROPH/DIAG INJ IV PUSH: CPT

## 2024-04-08 PROCEDURE — 83690 ASSAY OF LIPASE: CPT

## 2024-04-08 PROCEDURE — 96361 HYDRATE IV INFUSION ADD-ON: CPT

## 2024-04-08 PROCEDURE — 85025 COMPLETE CBC W/AUTO DIFF WBC: CPT

## 2024-04-08 PROCEDURE — 80047 BASIC METABLC PNL IONIZED CA: CPT

## 2024-04-08 PROCEDURE — 80076 HEPATIC FUNCTION PANEL: CPT

## 2024-04-08 PROCEDURE — 93005 ELECTROCARDIOGRAM TRACING: CPT

## 2024-04-08 RX ADMIN — SODIUM CHLORIDE ONE MLS/HR: 9 INJECTION, SOLUTION INTRAVENOUS at 13:08

## 2024-04-08 RX ADMIN — ONDANSETRON ONE MG: 2 INJECTION INTRAMUSCULAR; INTRAVENOUS at 13:08

## 2024-05-01 ENCOUNTER — HOSPITAL ENCOUNTER (OUTPATIENT)
Dept: HOSPITAL 53 - M LAB REF | Age: 59
End: 2024-05-01
Attending: FAMILY MEDICINE
Payer: COMMERCIAL

## 2024-05-01 DIAGNOSIS — I10: Primary | ICD-10-CM

## 2024-05-01 LAB
ALBUMIN SERPL BCG-MCNC: 3.6 G/DL (ref 3.2–5.2)
ALP SERPL-CCNC: 77 U/L (ref 46–116)
ALT SERPL W P-5'-P-CCNC: 26 U/L (ref 7–40)
AST SERPL-CCNC: 13 U/L (ref ?–34)
BILIRUB SERPL-MCNC: < 0.2 MG/DL (ref 0.3–1.2)
BUN SERPL-MCNC: 23 MG/DL (ref 9–23)
CALCIUM SERPL-MCNC: 9 MG/DL (ref 8.5–10.1)
CHLORIDE SERPL-SCNC: 105 MMOL/L (ref 98–107)
CHOLEST SERPL-MCNC: 204 MG/DL (ref ?–200)
CHOLEST/HDLC SERPL: 5 {RATIO} (ref ?–5)
CO2 SERPL-SCNC: 26 MMOL/L (ref 20–31)
CREAT SERPL-MCNC: 0.68 MG/DL (ref 0.55–1.3)
GFR SERPL CREATININE-BSD FRML MDRD: > 60 ML/MIN/{1.73_M2} (ref 51–?)
GLUCOSE SERPL-MCNC: 111 MG/DL (ref 60–100)
HDLC SERPL-MCNC: 40.8 MG/DL (ref 40–?)
LDLC SERPL CALC-MCNC: 138.4 MG/DL (ref ?–100)
NONHDLC SERPL-MCNC: 163.2 MG/DL
POTASSIUM SERPL-SCNC: 4 MMOL/L (ref 3.5–5.1)
PROT SERPL-MCNC: 7.2 G/DL (ref 5.7–8.2)
SODIUM SERPL-SCNC: 140 MMOL/L (ref 136–145)
TRIGL SERPL-MCNC: 124 MG/DL (ref ?–150)
TSH SERPL DL<=0.005 MIU/L-ACNC: 0.66 UIU/ML (ref 0.55–4.78)

## 2024-05-10 ENCOUNTER — HOSPITAL ENCOUNTER (OUTPATIENT)
Dept: HOSPITAL 53 - M PAIN | Age: 59
End: 2024-05-10
Attending: FAMILY MEDICINE
Payer: COMMERCIAL

## 2024-05-10 DIAGNOSIS — Z79.82: ICD-10-CM

## 2024-05-10 DIAGNOSIS — R29.6: ICD-10-CM

## 2024-05-10 DIAGNOSIS — Z87.891: ICD-10-CM

## 2024-05-10 DIAGNOSIS — I10: ICD-10-CM

## 2024-05-10 DIAGNOSIS — Z79.899: ICD-10-CM

## 2024-05-10 DIAGNOSIS — G89.29: Primary | ICD-10-CM

## 2024-05-10 DIAGNOSIS — M25.512: ICD-10-CM

## 2024-05-10 DIAGNOSIS — Z88.8: ICD-10-CM

## 2024-05-10 DIAGNOSIS — Z79.891: ICD-10-CM

## 2024-05-10 DIAGNOSIS — Z88.1: ICD-10-CM

## 2024-05-10 DIAGNOSIS — K21.9: ICD-10-CM

## 2024-05-10 DIAGNOSIS — M54.12: ICD-10-CM

## 2024-05-10 DIAGNOSIS — Z79.1: ICD-10-CM

## 2024-05-31 ENCOUNTER — HOSPITAL ENCOUNTER (OUTPATIENT)
Dept: HOSPITAL 53 - M PLAIMG | Age: 59
End: 2024-05-31
Attending: FAMILY MEDICINE
Payer: COMMERCIAL

## 2024-05-31 DIAGNOSIS — M54.12: Primary | ICD-10-CM

## 2024-06-14 ENCOUNTER — HOSPITAL ENCOUNTER (OUTPATIENT)
Dept: HOSPITAL 53 - M PLAIMG | Age: 59
End: 2024-06-14
Attending: FAMILY MEDICINE
Payer: COMMERCIAL

## 2024-06-14 DIAGNOSIS — M54.12: ICD-10-CM

## 2024-06-14 DIAGNOSIS — M25.512: Primary | ICD-10-CM

## 2024-06-20 ENCOUNTER — HOSPITAL ENCOUNTER (OUTPATIENT)
Dept: HOSPITAL 53 - M PAIN | Age: 59
End: 2024-06-20
Attending: FAMILY MEDICINE
Payer: COMMERCIAL

## 2024-06-20 DIAGNOSIS — Z87.891: ICD-10-CM

## 2024-06-20 DIAGNOSIS — Z79.899: ICD-10-CM

## 2024-06-20 DIAGNOSIS — Z79.82: ICD-10-CM

## 2024-06-20 DIAGNOSIS — I10: ICD-10-CM

## 2024-06-20 DIAGNOSIS — G89.29: ICD-10-CM

## 2024-06-20 DIAGNOSIS — M50.10: Primary | ICD-10-CM

## 2024-06-20 DIAGNOSIS — Z79.1: ICD-10-CM

## 2024-06-20 DIAGNOSIS — Z88.8: ICD-10-CM

## 2024-06-20 DIAGNOSIS — Z79.891: ICD-10-CM

## 2024-06-20 DIAGNOSIS — Z88.1: ICD-10-CM

## 2024-08-15 ENCOUNTER — HOSPITAL ENCOUNTER (EMERGENCY)
Dept: HOSPITAL 53 - M ED | Age: 59
LOS: 1 days | Discharge: HOME | End: 2024-08-16
Payer: COMMERCIAL

## 2024-08-15 VITALS — WEIGHT: 293 LBS | HEIGHT: 66 IN | BODY MASS INDEX: 47.09 KG/M2

## 2024-08-15 DIAGNOSIS — Z79.1: ICD-10-CM

## 2024-08-15 DIAGNOSIS — R07.89: Primary | ICD-10-CM

## 2024-08-15 DIAGNOSIS — I10: ICD-10-CM

## 2024-08-15 DIAGNOSIS — K21.9: ICD-10-CM

## 2024-08-15 DIAGNOSIS — Z79.899: ICD-10-CM

## 2024-08-15 DIAGNOSIS — F32.A: ICD-10-CM

## 2024-08-15 DIAGNOSIS — Z88.8: ICD-10-CM

## 2024-08-15 LAB
ALBUMIN SERPL BCG-MCNC: 3.9 G/DL (ref 3.2–5.2)
ALP SERPL-CCNC: 86 U/L (ref 46–116)
ALT SERPL W P-5'-P-CCNC: 30 U/L (ref 7–40)
AST SERPL-CCNC: 16 U/L (ref ?–34)
BASOPHILS # BLD AUTO: 0.1 10^3/UL (ref 0–0.2)
BASOPHILS NFR BLD AUTO: 0.6 % (ref 0–1)
BILIRUB CONJ SERPL-MCNC: 0.1 MG/DL (ref ?–0.4)
BILIRUB SERPL-MCNC: 0.4 MG/DL (ref 0.3–1.2)
BUN SERPL-MCNC: 17 MG/DL (ref 9–23)
CALCIUM SERPL-MCNC: 9.7 MG/DL (ref 8.5–10.1)
CHLORIDE SERPL-SCNC: 104 MMOL/L (ref 98–107)
CK MB CFR.DF SERPL CALC: 1.25
CK MB CFR.DF SERPL CALC: 1.32
CK MB SERPL-MCNC: 2.1 NG/ML (ref ?–3.6)
CK MB SERPL-MCNC: 2.1 NG/ML (ref ?–3.6)
CK SERPL-CCNC: 159 U/L (ref 34–145)
CK SERPL-CCNC: 167 U/L (ref 34–145)
CO2 SERPL-SCNC: 27 MMOL/L (ref 20–31)
CREAT SERPL-MCNC: 0.69 MG/DL (ref 0.55–1.3)
EOSINOPHIL # BLD AUTO: 0.4 10^3/UL (ref 0–0.5)
EOSINOPHIL NFR BLD AUTO: 4 % (ref 0–3)
GFR SERPL CREATININE-BSD FRML MDRD: > 60 ML/MIN/{1.73_M2} (ref 51–?)
GLUCOSE SERPL-MCNC: 104 MG/DL (ref 60–100)
HCT VFR BLD AUTO: 40.4 % (ref 36–47)
HGB BLD-MCNC: 13.8 G/DL (ref 12–15.5)
LIPASE SERPL-CCNC: 32 U/L (ref 12–53)
LYMPHOCYTES # BLD AUTO: 1.9 10^3/UL (ref 1.5–5)
LYMPHOCYTES NFR BLD AUTO: 18.8 % (ref 24–44)
MCH RBC QN AUTO: 29.6 PG (ref 27–33)
MCHC RBC AUTO-ENTMCNC: 34.2 G/DL (ref 32–36.5)
MCV RBC AUTO: 86.5 FL (ref 80–96)
MONOCYTES # BLD AUTO: 0.8 10^3/UL (ref 0–0.8)
MONOCYTES NFR BLD AUTO: 8.2 % (ref 2–8)
NEUTROPHILS # BLD AUTO: 6.9 10^3/UL (ref 1.5–8.5)
NEUTROPHILS NFR BLD AUTO: 67.6 % (ref 36–66)
PLATELET # BLD AUTO: 291 10^3/UL (ref 150–450)
POTASSIUM SERPL-SCNC: 3.3 MMOL/L (ref 3.5–5.1)
PROT SERPL-MCNC: 7.7 G/DL (ref 5.7–8.2)
RBC # BLD AUTO: 4.67 10^6/UL (ref 4–5.4)
SODIUM SERPL-SCNC: 138 MMOL/L (ref 136–145)
WBC # BLD AUTO: 10.2 10^3/UL (ref 4–10)

## 2024-08-15 PROCEDURE — 83880 ASSAY OF NATRIURETIC PEPTIDE: CPT

## 2024-08-15 PROCEDURE — 82550 ASSAY OF CK (CPK): CPT

## 2024-08-15 PROCEDURE — 93005 ELECTROCARDIOGRAM TRACING: CPT

## 2024-08-15 PROCEDURE — 80048 BASIC METABOLIC PNL TOTAL CA: CPT

## 2024-08-15 PROCEDURE — 84484 ASSAY OF TROPONIN QUANT: CPT

## 2024-08-15 PROCEDURE — 85025 COMPLETE CBC W/AUTO DIFF WBC: CPT

## 2024-08-15 PROCEDURE — 85730 THROMBOPLASTIN TIME PARTIAL: CPT

## 2024-08-15 PROCEDURE — 71045 X-RAY EXAM CHEST 1 VIEW: CPT

## 2024-08-15 PROCEDURE — 99285 EMERGENCY DEPT VISIT HI MDM: CPT

## 2024-08-15 PROCEDURE — 82553 CREATINE MB FRACTION: CPT

## 2024-08-15 PROCEDURE — 96374 THER/PROPH/DIAG INJ IV PUSH: CPT

## 2024-08-15 PROCEDURE — 80076 HEPATIC FUNCTION PANEL: CPT

## 2024-08-15 PROCEDURE — 83690 ASSAY OF LIPASE: CPT

## 2024-08-16 VITALS — OXYGEN SATURATION: 98 % | TEMPERATURE: 96.9 F

## 2024-08-16 VITALS — DIASTOLIC BLOOD PRESSURE: 72 MMHG | SYSTOLIC BLOOD PRESSURE: 163 MMHG

## 2024-08-16 RX ADMIN — ONDANSETRON ONE MG: 2 INJECTION INTRAMUSCULAR; INTRAVENOUS at 03:50

## 2024-08-16 RX ADMIN — ALUMINUM HYDROXIDE, MAGNESIUM HYDROXIDE, AND SIMETHICONE ONE ML: 200; 200; 20 SUSPENSION ORAL at 03:50

## 2024-08-16 RX ADMIN — KETOROLAC TROMETHAMINE ONE MG: 30 INJECTION, SOLUTION INTRAMUSCULAR at 03:50

## 2024-08-22 ENCOUNTER — HOSPITAL ENCOUNTER (OUTPATIENT)
Dept: HOSPITAL 53 - M RAD | Age: 59
End: 2024-08-22
Attending: STUDENT IN AN ORGANIZED HEALTH CARE EDUCATION/TRAINING PROGRAM
Payer: COMMERCIAL

## 2024-08-22 DIAGNOSIS — R91.8: Primary | ICD-10-CM

## 2024-09-19 ENCOUNTER — HOSPITAL ENCOUNTER (OUTPATIENT)
Dept: HOSPITAL 53 - M PAIN | Age: 59
End: 2024-09-19
Attending: ANESTHESIOLOGY
Payer: COMMERCIAL

## 2024-09-19 DIAGNOSIS — Z79.891: ICD-10-CM

## 2024-09-19 DIAGNOSIS — Z87.891: ICD-10-CM

## 2024-09-19 DIAGNOSIS — Z79.1: ICD-10-CM

## 2024-09-19 DIAGNOSIS — Z88.1: ICD-10-CM

## 2024-09-19 DIAGNOSIS — G89.29: ICD-10-CM

## 2024-09-19 DIAGNOSIS — M50.13: Primary | ICD-10-CM

## 2024-09-19 DIAGNOSIS — K21.9: ICD-10-CM

## 2024-09-19 DIAGNOSIS — Z79.899: ICD-10-CM

## 2024-09-19 DIAGNOSIS — I10: ICD-10-CM

## 2024-09-19 DIAGNOSIS — Z79.82: ICD-10-CM

## 2024-09-19 DIAGNOSIS — M25.512: ICD-10-CM

## 2024-09-19 DIAGNOSIS — Z88.8: ICD-10-CM

## 2024-09-19 DIAGNOSIS — M25.511: ICD-10-CM

## 2024-09-19 PROCEDURE — 62321 NJX INTERLAMINAR CRV/THRC: CPT

## 2024-10-21 ENCOUNTER — HOSPITAL ENCOUNTER (OUTPATIENT)
Dept: HOSPITAL 53 - M LAB REF | Age: 59
End: 2024-10-21
Attending: FAMILY MEDICINE
Payer: COMMERCIAL

## 2024-10-21 DIAGNOSIS — I10: Primary | ICD-10-CM

## 2024-10-21 LAB
ALBUMIN SERPL BCG-MCNC: 3.6 G/DL (ref 3.2–5.2)
ALP SERPL-CCNC: 84 U/L (ref 46–116)
ALT SERPL W P-5'-P-CCNC: 27 U/L (ref 7–40)
AST SERPL-CCNC: 9 U/L (ref ?–34)
BILIRUB SERPL-MCNC: 0.3 MG/DL (ref 0.3–1.2)
BUN SERPL-MCNC: 20 MG/DL (ref 9–23)
CALCIUM SERPL-MCNC: 9.9 MG/DL (ref 8.5–10.1)
CHLORIDE SERPL-SCNC: 107 MMOL/L (ref 98–107)
CHOLEST SERPL-MCNC: 196 MG/DL (ref ?–200)
CHOLEST/HDLC SERPL: 5.06 {RATIO} (ref ?–5)
CO2 SERPL-SCNC: 26 MMOL/L (ref 20–31)
CREAT SERPL-MCNC: 0.68 MG/DL (ref 0.55–1.3)
EST. AVERAGE GLUCOSE BLD GHB EST-MCNC: 111 MG/DL (ref 60–110)
GFR SERPL CREATININE-BSD FRML MDRD: > 60 ML/MIN/{1.73_M2} (ref 51–?)
GLUCOSE SERPL-MCNC: 99 MG/DL (ref 60–100)
HDLC SERPL-MCNC: 38.7 MG/DL (ref 40–?)
LDLC SERPL CALC-MCNC: 133.5 MG/DL (ref ?–100)
NONHDLC SERPL-MCNC: 157.3 MG/DL
POTASSIUM SERPL-SCNC: 4.3 MMOL/L (ref 3.5–5.1)
PROT SERPL-MCNC: 7.5 G/DL (ref 5.7–8.2)
SODIUM SERPL-SCNC: 141 MMOL/L (ref 136–145)
TRIGL SERPL-MCNC: 119 MG/DL (ref ?–150)
TSH SERPL DL<=0.005 MIU/L-ACNC: 1.02 UIU/ML (ref 0.55–4.78)

## 2024-10-22 ENCOUNTER — HOSPITAL ENCOUNTER (OUTPATIENT)
Dept: HOSPITAL 53 - M PAIN | Age: 59
End: 2024-10-22
Attending: FAMILY MEDICINE
Payer: COMMERCIAL

## 2024-10-22 DIAGNOSIS — M79.10: ICD-10-CM

## 2024-10-22 DIAGNOSIS — Z88.1: ICD-10-CM

## 2024-10-22 DIAGNOSIS — Z79.899: ICD-10-CM

## 2024-10-22 DIAGNOSIS — G89.29: Primary | ICD-10-CM

## 2024-10-22 DIAGNOSIS — Z79.82: ICD-10-CM

## 2024-10-22 DIAGNOSIS — M25.512: ICD-10-CM

## 2024-10-22 DIAGNOSIS — Z79.891: ICD-10-CM

## 2024-10-22 DIAGNOSIS — K21.9: ICD-10-CM

## 2024-10-22 DIAGNOSIS — Z79.1: ICD-10-CM

## 2024-10-22 DIAGNOSIS — Z88.8: ICD-10-CM

## 2024-10-22 DIAGNOSIS — I10: ICD-10-CM

## 2024-10-22 DIAGNOSIS — M25.511: ICD-10-CM

## 2024-10-22 DIAGNOSIS — Z87.891: ICD-10-CM

## 2024-10-22 DIAGNOSIS — M50.10: ICD-10-CM

## 2025-01-26 ENCOUNTER — HOSPITAL ENCOUNTER (OUTPATIENT)
Dept: HOSPITAL 53 - M SLEEP | Age: 60
End: 2025-01-26
Attending: STUDENT IN AN ORGANIZED HEALTH CARE EDUCATION/TRAINING PROGRAM
Payer: MEDICARE

## 2025-01-26 DIAGNOSIS — G47.33: Primary | ICD-10-CM

## 2025-02-06 ENCOUNTER — HOSPITAL ENCOUNTER (OUTPATIENT)
Dept: HOSPITAL 53 - M PAIN | Age: 60
End: 2025-02-06
Attending: FAMILY MEDICINE
Payer: MEDICARE

## 2025-02-06 DIAGNOSIS — I10: ICD-10-CM

## 2025-02-06 DIAGNOSIS — Z79.899: ICD-10-CM

## 2025-02-06 DIAGNOSIS — M79.89: ICD-10-CM

## 2025-02-06 DIAGNOSIS — K21.9: ICD-10-CM

## 2025-02-06 DIAGNOSIS — M50.10: Primary | ICD-10-CM

## 2025-02-06 DIAGNOSIS — Z88.8: ICD-10-CM

## 2025-02-06 DIAGNOSIS — M47.816: ICD-10-CM

## 2025-02-06 DIAGNOSIS — Z79.82: ICD-10-CM

## 2025-02-06 DIAGNOSIS — Z79.1: ICD-10-CM

## 2025-02-06 DIAGNOSIS — Z88.1: ICD-10-CM

## 2025-02-06 DIAGNOSIS — M79.18: ICD-10-CM

## 2025-04-25 ENCOUNTER — HOSPITAL ENCOUNTER (OUTPATIENT)
Dept: HOSPITAL 53 - M LAB REF | Age: 60
End: 2025-04-25
Attending: FAMILY MEDICINE
Payer: COMMERCIAL

## 2025-04-25 DIAGNOSIS — I10: Primary | ICD-10-CM

## 2025-04-25 LAB
ALP SERPL-CCNC: 77 U/L (ref 35–104)
ALT SERPL W P-5'-P-CCNC: 28 U/L (ref 7–40)
AST SERPL-CCNC: 16 U/L (ref ?–34)
BILIRUB SERPL-MCNC: 0.4 MG/DL (ref 0.3–1.2)
BUN SERPL-MCNC: 19 MG/DL (ref 9–23)
CALCIUM SERPL-MCNC: 9.4 MG/DL (ref 8.5–10.1)
CHLORIDE SERPL-SCNC: 104 MMOL/L (ref 98–107)
CHOLEST SERPL-MCNC: 225 MG/DL (ref ?–200)
CHOLEST/HDLC SERPL: 5.61 {RATIO} (ref ?–5)
CO2 SERPL-SCNC: 26 MMOL/L (ref 20–31)
CREAT SERPL-MCNC: 0.69 MG/DL (ref 0.55–1.3)
GFR SERPL CREATININE-BSD FRML MDRD: > 90 ML/MIN/{1.73_M2} (ref 51–?)
GLUCOSE SERPL-MCNC: 105 MG/DL (ref 60–100)
HDLC SERPL-MCNC: 40.1 MG/DL (ref 40–?)
LDLC SERPL CALC-MCNC: 146.1 MG/DL (ref ?–100)
NONHDLC SERPL-MCNC: 184.9 MG/DL
POTASSIUM SERPL-SCNC: 4.2 MMOL/L (ref 3.5–5.1)
PROT SERPL-MCNC: 7.7 G/DL (ref 5.7–8.2)
SODIUM SERPL-SCNC: 140 MMOL/L (ref 136–145)
TRIGL SERPL-MCNC: 194 MG/DL (ref ?–150)
TSH SERPL DL<=0.005 MIU/L-ACNC: 0.49 UIU/ML (ref 0.55–4.78)

## 2025-05-01 ENCOUNTER — HOSPITAL ENCOUNTER (OUTPATIENT)
Dept: HOSPITAL 53 - M SLEEP | Age: 60
End: 2025-05-01
Attending: STUDENT IN AN ORGANIZED HEALTH CARE EDUCATION/TRAINING PROGRAM
Payer: MEDICARE

## 2025-05-01 DIAGNOSIS — G47.33: Primary | ICD-10-CM
